# Patient Record
Sex: MALE | Race: WHITE | NOT HISPANIC OR LATINO | Employment: FULL TIME | URBAN - METROPOLITAN AREA
[De-identification: names, ages, dates, MRNs, and addresses within clinical notes are randomized per-mention and may not be internally consistent; named-entity substitution may affect disease eponyms.]

---

## 2023-10-23 ENCOUNTER — OFFICE VISIT (OUTPATIENT)
Dept: INTERNAL MEDICINE CLINIC | Facility: CLINIC | Age: 53
End: 2023-10-23
Payer: COMMERCIAL

## 2023-10-23 VITALS
DIASTOLIC BLOOD PRESSURE: 80 MMHG | HEIGHT: 71 IN | TEMPERATURE: 98 F | HEART RATE: 96 BPM | BODY MASS INDEX: 27.16 KG/M2 | WEIGHT: 194 LBS | SYSTOLIC BLOOD PRESSURE: 110 MMHG | OXYGEN SATURATION: 97 %

## 2023-10-23 DIAGNOSIS — G62.0 NEUROPATHY DUE TO CHEMOTHERAPEUTIC DRUG: ICD-10-CM

## 2023-10-23 DIAGNOSIS — F90.0 ATTENTION DEFICIT HYPERACTIVITY DISORDER (ADHD), PREDOMINANTLY INATTENTIVE TYPE: ICD-10-CM

## 2023-10-23 DIAGNOSIS — C77.2 COLON CANCER METASTASIZED TO INTRA-ABDOMINAL LYMPH NODE (HCC): Primary | ICD-10-CM

## 2023-10-23 DIAGNOSIS — Z13.0 SCREENING FOR DEFICIENCY ANEMIA: ICD-10-CM

## 2023-10-23 DIAGNOSIS — R73.9 HYPERGLYCEMIA: ICD-10-CM

## 2023-10-23 DIAGNOSIS — C18.9 COLON CANCER METASTASIZED TO INTRA-ABDOMINAL LYMPH NODE (HCC): Primary | ICD-10-CM

## 2023-10-23 DIAGNOSIS — Z13.6 SCREENING FOR CARDIOVASCULAR CONDITION: ICD-10-CM

## 2023-10-23 DIAGNOSIS — F32.0 CURRENT MILD EPISODE OF MAJOR DEPRESSIVE DISORDER WITHOUT PRIOR EPISODE (HCC): ICD-10-CM

## 2023-10-23 DIAGNOSIS — E55.9 VITAMIN D DEFICIENCY: ICD-10-CM

## 2023-10-23 DIAGNOSIS — E78.2 MIXED HYPERLIPIDEMIA: ICD-10-CM

## 2023-10-23 DIAGNOSIS — M54.16 LUMBAR RADICULOPATHY: ICD-10-CM

## 2023-10-23 DIAGNOSIS — T45.1X5A NEUROPATHY DUE TO CHEMOTHERAPEUTIC DRUG: ICD-10-CM

## 2023-10-23 PROCEDURE — 99203 OFFICE O/P NEW LOW 30 MIN: CPT | Performed by: INTERNAL MEDICINE

## 2023-10-23 NOTE — ASSESSMENT & PLAN NOTE
Plans he is on treatment for ADHD and current medication does not know the name and the dosage of the medication advised to call us with information

## 2023-10-23 NOTE — PROGRESS NOTES
Dr. Yovana Quintana Office Visit Note  10/23/23     iNsh Torrez 48 y.o. male MRN: [id number]  : 1970    Assessment:   Awaiting old medical records awaiting medical records from the previous physician  1. Colon cancer metastasized to intra-abdominal lymph node [facility])  Assessment & Plan:  Patient seen in the office first time [place] history of for colon cancer treated with surgery and chemo finished chemo about 4 months ago details of the surgery chemo and the staging information not available yet requested to get as soon as possible but as per patient stage III colon cancer abdominal lymph nodes involved to be evaluated by GI and oncology as patient requested    Orders:  -     Ambulatory Referral to Gastroenterology; Future  -     Ambulatory Referral to Hematology / Oncology; Future    2. Vitamin D deficiency  -     Vitamin D 25 hydroxy; Future; Expected date: 2023    3. Screening for cardiovascular condition  -     Comprehensive metabolic panel; Future    4. Hyperglycemia  -     Hemoglobin A1C; Future  -     Urinalysis with microscopic; Future  -     Albumin / creatinine urine ratio; Future    5. Screening for deficiency anemia  -     CBC and differential; Future    6. Mixed hyperlipidemia  -     Comprehensive metabolic panel; Future  -     Lipid Panel with Direct LDL reflex; Future    7. Attention deficit hyperactivity disorder (ADHD), predominantly inattentive type  Assessment & Plan:  Plans he is on treatment for ADHD and current medication does not know the name and the dosage of the medication advised to call us with information    Orders:  -     Comprehensive metabolic panel; Future    8. Lumbar radiculopathy  -     Comprehensive metabolic panel; Future    9.  Current mild episode of major depressive disorder without prior episode [facility])  Assessment & Plan:  Patient is claims he is on antidepressant does not know the name and dosage advised to call us with information      10. Neuropathy due to chemotherapeutic drug   Assessment & Plan:  Patient complains of tingling numbness both upper lower extremity and diagnosis of neuropathy due to the chemo on gabapentin the dose if not available at present time seems to be helping continue gabapentin the dose is advised to call us with information            Discussion Summary and Plan: Today's care plan and medications were reviewed with patient in detail and all their questions answered to their satisfaction. Chief Complaint   Patient presents with   • New Patient Visit     Referral for an oncologist to follow up for cancer stage 3 colon cancer. Went though chemo and surgery. Subjective:  Patient came in first time in the office like to be established with a primary care physician and patient has no medical record at present time and all the information obtained from the patient verbally diagnosis stage III colon cancer underwent surgery does not know the details underwent chemo does not have the detail developed neuropathy both upper lower extremity and gabapentin seems to be improving improved history of depression on antidepressant and history of ADHD treatment. Does not know the detail he was given complete blood work to be seen next week with all the medication information and will try to get the old medical record from a previous physician as soon as possible        The following portions of the patient's history were reviewed and updated as appropriate: allergies, current medications, past family history, past medical history, past social history, past surgical history and problem list.    Review of Systems   Constitutional:  Positive for activity change. Negative for appetite change, chills, diaphoresis, fatigue, fever and unexpected weight change.    HENT:  Negative for congestion, dental problem, drooling, ear discharge, ear pain, facial swelling, hearing loss, mouth sores, nosebleeds, postnasal drip, rhinorrhea, sinus pressure, sneezing, sore throat, tinnitus, trouble swallowing and voice change. Eyes:  Negative for photophobia, pain, discharge, redness, itching and visual disturbance. Respiratory:  Negative for apnea, cough, choking, chest tightness, shortness of breath, wheezing and stridor. Cardiovascular:  Negative for chest pain, palpitations and leg swelling. Gastrointestinal:  Negative for abdominal distention, abdominal pain, anal bleeding, blood in stool, constipation, diarrhea, nausea, rectal pain and vomiting. Endocrine: Negative for cold intolerance, heat intolerance, polydipsia, polyphagia and polyuria. Genitourinary:  Negative for decreased urine volume, difficulty urinating, dysuria, enuresis, flank pain, frequency, genital sores, hematuria and urgency. Musculoskeletal:  Positive for arthralgias. Negative for back pain, gait problem, joint swelling, myalgias, neck pain and neck stiffness. Skin:  Negative for color change, pallor, rash and wound. Allergic/Immunologic: Negative. Negative for environmental allergies, food allergies and immunocompromised state. Neurological:  Positive for numbness. Negative for dizziness, tremors, seizures, syncope, facial asymmetry, speech difficulty, weakness, light-headedness and headaches. Psychiatric/Behavioral:  Negative for agitation, behavioral problems, confusion, decreased concentration, dysphoric mood, hallucinations, self-injury, sleep disturbance and suicidal ideas. The patient is not nervous/anxious and is not hyperactive. Historical Information   Patient Active Problem List   Diagnosis   • Colon cancer metastasized to intra-abdominal lymph node ([address])   • Current mild episode of major depressive disorder without prior episode [facility])   • Lumbar radiculopathy   • Attention deficit hyperactivity disorder (ADHD), predominantly inattentive type   • Neuropathy due to chemotherapeutic drug      History reviewed.  No pertinent past medical history. History reviewed. No pertinent surgical history. Social History     Substance and Sexual Activity   Alcohol Use Never     Social History     Substance and Sexual Activity   Drug Use Never     Social History     Tobacco Use   Smoking Status Never   Smokeless Tobacco Never     History reviewed. No pertinent family history. Health Maintenance Due   Topic   • Hepatitis C Screening    • COVID-19 Vaccine (1)   • Pneumococcal Vaccine: Pediatrics (0 to 5 Years) and At-Risk Patients (6 to 59 Years) (1 - PCV)   • Depression Remission PHQ    • HIV Screening    • BMI: Followup Plan    • Annual Physical    • DTaP,Tdap,and Td Vaccines (1 - Tdap)   • Influenza Vaccine (1)      Meds/Allergies     No current outpatient medications on file. Objective:    Vitals:   /80   Pulse 96   Temp 98 °F (36.7 °C)   Ht 5' 11" (1.803 m)   Wt 88 kg (194 lb)   SpO2 97%   BMI 27.06 kg/m²   Body mass index is 27.06 kg/m². Vitals:    10/23/23 1517   Weight: 88 kg (194 lb)       Physical Exam  Constitutional:       General: He is not in acute distress. Appearance: He is well-developed. He is not ill-appearing, toxic-appearing or diaphoretic. HENT:      Head: Normocephalic and atraumatic. Right Ear: External ear normal.      Left Ear: External ear normal.      Nose: Nose normal.      Mouth/Throat:      Pharynx: No oropharyngeal exudate. Eyes:      General: Lids are normal. Lids are everted, no foreign bodies appreciated. No scleral icterus. Right eye: No discharge. Left eye: No discharge. Conjunctiva/sclera: Conjunctivae normal.      Pupils: Pupils are equal, round, and reactive to light. Neck:      Thyroid: No thyromegaly. Vascular: Normal carotid pulses. No carotid bruit, hepatojugular reflux or JVD. Trachea: No tracheal tenderness or tracheal deviation. Cardiovascular:      Rate and Rhythm: Normal rate and regular rhythm. Pulses: Normal pulses.       Heart sounds: Normal heart sounds. No murmur heard. No friction rub. No gallop. Pulmonary:      Effort: Pulmonary effort is normal. No respiratory distress. Breath sounds: Normal breath sounds. No stridor. No wheezing or rales. Chest:      Chest wall: No tenderness. Abdominal:      General: Bowel sounds are normal. There is no distension. Palpations: Abdomen is soft. There is no mass. Tenderness: There is no abdominal tenderness. There is no guarding or rebound. Musculoskeletal:         General: No tenderness or deformity. Normal range of motion. Cervical back: Normal range of motion and neck supple. No edema, erythema or rigidity. No spinous process tenderness or muscular tenderness. Normal range of motion. Lymphadenopathy:      Head:      Right side of head: No submental, submandibular, tonsillar, preauricular or posterior auricular adenopathy. Left side of head: No submental, submandibular, tonsillar, preauricular, posterior auricular or occipital adenopathy. Cervical: No cervical adenopathy. Right cervical: No superficial, deep or posterior cervical adenopathy. Left cervical: No superficial, deep or posterior cervical adenopathy. Upper Body:      Right upper body: No pectoral adenopathy. Left upper body: No pectoral adenopathy. Skin:     General: Skin is warm and dry. Coloration: Skin is not pale. Findings: No erythema or rash. Neurological:      General: No focal deficit present. Mental Status: He is alert and oriented to person, place, and time. Cranial Nerves: No cranial nerve deficit. Sensory: No sensory deficit. Motor: No tremor, abnormal muscle tone or seizure activity. Coordination: Coordination normal.      Gait: Gait normal.      Deep Tendon Reflexes: Reflexes are normal and symmetric. Reflexes normal.   Psychiatric:         Behavior: Behavior normal.         Thought Content:  Thought content normal. Judgment: Judgment normal.         Lab Review   No visits with results within 2 Month(s) from this visit. Latest known visit with results is:   No results found for any previous visit. Patient Instructions   Pt is symptom free for this problem. This diagnosis or problem is stable/well controlled  Patient is advised to continue same meds as outlined in medicine list  Pt is advised to continue to follow with specialist if they are following for this problme  Pt should get blood test or other testing as per specialist ( or myself) prior to your next visit. Alicia Hilario MD        "This note has been constructed using a voice recognition system. Therefore there may be syntax, spelling, and/or grammatical errors.  Please call if you have any questions. "

## 2023-10-23 NOTE — ASSESSMENT & PLAN NOTE
Patient seen in the office first time [place] history of for colon cancer treated with surgery and chemo finished chemo about 4 months ago details of the surgery chemo and the staging information not available yet requested to get as soon as possible but as per patient stage III colon cancer abdominal lymph nodes involved to be evaluated by GI and oncology as patient requested

## 2023-10-23 NOTE — ASSESSMENT & PLAN NOTE
Patient complains of tingling numbness both upper lower extremity and diagnosis of neuropathy due to the chemo on gabapentin the dose if not available at present time seems to be helping continue gabapentin the dose is advised to call us with information

## 2023-10-23 NOTE — PATIENT INSTRUCTIONS
Pt is symptom free for this problem. This diagnosis or problem is stable/well controlled  Patient is advised to continue same meds as outlined in medicine list  Pt is advised to continue to follow with specialist if they are following for this problme  Pt should get blood test or other testing as per specialist ( or myself) prior to your next visit.

## 2023-10-23 NOTE — ASSESSMENT & PLAN NOTE
Patient is claims he is on antidepressant does not know the name and dosage advised to call us with information

## 2023-10-24 ENCOUNTER — TELEPHONE (OUTPATIENT)
Dept: HEMATOLOGY ONCOLOGY | Facility: CLINIC | Age: 53
End: 2023-10-24

## 2023-10-24 NOTE — TELEPHONE ENCOUNTER
I called [facility] in response to a referral that was received for patient to establish care with Medical Oncology. Outreach was made to schedule a consultation. Patient does not have a voicemail set up. Another attempt will be made to contact patient.

## 2023-10-26 ENCOUNTER — TELEPHONE (OUTPATIENT)
Dept: HEMATOLOGY ONCOLOGY | Facility: CLINIC | Age: 53
End: 2023-10-26

## 2023-10-26 NOTE — TELEPHONE ENCOUNTER
I called Nina Escalante in response to a referral that was received for patient to establish care with Medical Oncology. Outreach was made to schedule a consultation. Patient does not have a voicemail set up. Another attempt will be made to contact patient.

## 2023-10-27 ENCOUNTER — TELEPHONE (OUTPATIENT)
Dept: HEMATOLOGY ONCOLOGY | Facility: CLINIC | Age: 53
End: 2023-10-27

## 2023-10-27 NOTE — TELEPHONE ENCOUNTER
I called [facility] in response to a referral that was received for patient to establish care with Medical Oncology. Outreach was made to schedule a consultation. Patient does not have a voicemail set up. The referral has been closed.

## 2023-11-07 DIAGNOSIS — G62.9 NEUROPATHY: ICD-10-CM

## 2023-11-07 DIAGNOSIS — N52.9 ERECTILE DYSFUNCTION, UNSPECIFIED ERECTILE DYSFUNCTION TYPE: Primary | ICD-10-CM

## 2023-11-07 DIAGNOSIS — F90.9 ATTENTION DEFICIT HYPERACTIVITY DISORDER (ADHD), UNSPECIFIED ADHD TYPE: ICD-10-CM

## 2023-11-07 DIAGNOSIS — F32.A DEPRESSION, UNSPECIFIED DEPRESSION TYPE: ICD-10-CM

## 2023-11-07 DIAGNOSIS — E11.65 TYPE 2 DIABETES MELLITUS WITH HYPERGLYCEMIA, WITHOUT LONG-TERM CURRENT USE OF INSULIN (HCC): ICD-10-CM

## 2023-11-07 RX ORDER — ESCITALOPRAM OXALATE 20 MG/1
20 TABLET ORAL DAILY
COMMUNITY
End: 2023-11-07 | Stop reason: SDUPTHER

## 2023-11-07 RX ORDER — PIOGLITAZONEHYDROCHLORIDE 15 MG/1
15 TABLET ORAL DAILY
COMMUNITY
End: 2023-11-07 | Stop reason: SDUPTHER

## 2023-11-07 RX ORDER — MULTIVIT WITH MINERALS/LUTEIN
1000 TABLET ORAL DAILY
COMMUNITY

## 2023-11-07 RX ORDER — LISDEXAMFETAMINE DIMESYLATE CAPSULES 70 MG/1
70 CAPSULE ORAL EVERY MORNING
COMMUNITY
End: 2023-11-07 | Stop reason: SDUPTHER

## 2023-11-07 RX ORDER — PIOGLITAZONEHYDROCHLORIDE 15 MG/1
15 TABLET ORAL DAILY
Qty: 90 TABLET | Refills: 0 | Status: SHIPPED | OUTPATIENT
Start: 2023-11-07

## 2023-11-07 RX ORDER — SILDENAFIL 100 MG/1
100 TABLET, FILM COATED ORAL DAILY PRN
Qty: 10 TABLET | Refills: 1 | Status: SHIPPED | OUTPATIENT
Start: 2023-11-07

## 2023-11-07 RX ORDER — SILDENAFIL 100 MG/1
100 TABLET, FILM COATED ORAL DAILY PRN
Qty: 10 TABLET | Refills: 1 | Status: SHIPPED | OUTPATIENT
Start: 2023-11-07 | End: 2023-11-07 | Stop reason: SDUPTHER

## 2023-11-07 RX ORDER — GABAPENTIN 300 MG/1
300 CAPSULE ORAL DAILY
COMMUNITY
End: 2023-11-07 | Stop reason: SDUPTHER

## 2023-11-07 RX ORDER — SILDENAFIL 100 MG/1
100 TABLET, FILM COATED ORAL DAILY PRN
COMMUNITY
End: 2023-11-07 | Stop reason: SDUPTHER

## 2023-11-07 RX ORDER — GABAPENTIN 300 MG/1
300 CAPSULE ORAL DAILY
Qty: 30 CAPSULE | Refills: 0 | Status: SHIPPED | OUTPATIENT
Start: 2023-11-07

## 2023-11-07 RX ORDER — AMOXICILLIN 500 MG
1200 CAPSULE ORAL DAILY
COMMUNITY

## 2023-11-07 RX ORDER — OMEPRAZOLE 20 MG/1
20 CAPSULE, DELAYED RELEASE ORAL DAILY
COMMUNITY

## 2023-11-07 RX ORDER — LISDEXAMFETAMINE DIMESYLATE CAPSULES 70 MG/1
70 CAPSULE ORAL EVERY MORNING
Qty: 30 CAPSULE | Refills: 0 | Status: SHIPPED | OUTPATIENT
Start: 2023-11-07

## 2023-11-07 RX ORDER — ZINC GLUCONATE 50 MG
50 TABLET ORAL DAILY
COMMUNITY

## 2023-11-07 RX ORDER — ESCITALOPRAM OXALATE 20 MG/1
20 TABLET ORAL DAILY
Qty: 30 TABLET | Refills: 0 | Status: SHIPPED | OUTPATIENT
Start: 2023-11-07

## 2023-11-07 NOTE — TELEPHONE ENCOUNTER
Patient called to report that the pharmacy this was sent to does not take his insurance.   He is requesting that it please be re-sent to the followin [facility]  [phone]

## 2023-11-08 DIAGNOSIS — F32.A DEPRESSION, UNSPECIFIED DEPRESSION TYPE: ICD-10-CM

## 2023-11-08 DIAGNOSIS — E11.65 TYPE 2 DIABETES MELLITUS WITH HYPERGLYCEMIA, WITHOUT LONG-TERM CURRENT USE OF INSULIN (HCC): ICD-10-CM

## 2023-11-08 DIAGNOSIS — G62.9 NEUROPATHY: ICD-10-CM

## 2023-11-08 DIAGNOSIS — F90.9 ATTENTION DEFICIT HYPERACTIVITY DISORDER (ADHD), UNSPECIFIED ADHD TYPE: ICD-10-CM

## 2023-11-08 RX ORDER — PIOGLITAZONEHYDROCHLORIDE 15 MG/1
15 TABLET ORAL DAILY
Qty: 90 TABLET | Refills: 0 | OUTPATIENT
Start: 2023-11-08

## 2023-11-08 RX ORDER — LISDEXAMFETAMINE DIMESYLATE CAPSULES 70 MG/1
70 CAPSULE ORAL EVERY MORNING
Qty: 30 CAPSULE | Refills: 0 | OUTPATIENT
Start: 2023-11-08

## 2023-11-08 RX ORDER — ESCITALOPRAM OXALATE 20 MG/1
20 TABLET ORAL DAILY
Qty: 30 TABLET | Refills: 0 | OUTPATIENT
Start: 2023-11-08

## 2023-11-08 RX ORDER — GABAPENTIN 300 MG/1
300 CAPSULE ORAL DAILY
Qty: 30 CAPSULE | Refills: 0 | OUTPATIENT
Start: 2023-11-08

## 2023-11-08 NOTE — TELEPHONE ENCOUNTER
Pt called stating his pharmacy did not receive 4 of his prescriptions.   He is asking if they can be sent to another [facility] closer to his job\

## 2023-11-17 ENCOUNTER — APPOINTMENT (OUTPATIENT)
Dept: LAB | Facility: CLINIC | Age: 53
End: 2023-11-17
Payer: COMMERCIAL

## 2023-11-17 DIAGNOSIS — R73.9 HYPERGLYCEMIA: ICD-10-CM

## 2023-11-17 DIAGNOSIS — Z13.0 SCREENING FOR DEFICIENCY ANEMIA: ICD-10-CM

## 2023-11-17 DIAGNOSIS — Z13.6 SCREENING FOR CARDIOVASCULAR CONDITION: ICD-10-CM

## 2023-11-17 DIAGNOSIS — F90.0 ATTENTION DEFICIT HYPERACTIVITY DISORDER (ADHD), PREDOMINANTLY INATTENTIVE TYPE: ICD-10-CM

## 2023-11-17 DIAGNOSIS — E78.2 MIXED HYPERLIPIDEMIA: ICD-10-CM

## 2023-11-17 DIAGNOSIS — M54.16 LUMBAR RADICULOPATHY: ICD-10-CM

## 2023-11-17 DIAGNOSIS — E55.9 VITAMIN D DEFICIENCY: ICD-10-CM

## 2023-11-17 LAB
25(OH)D3 SERPL-MCNC: 53.4 NG/ML (ref 30–100)
ALBUMIN SERPL BCP-MCNC: 4.2 G/DL (ref 3.5–5)
ALP SERPL-CCNC: 88 U/L (ref 34–104)
ALT SERPL W P-5'-P-CCNC: 21 U/L (ref 7–52)
ANION GAP SERPL CALCULATED.3IONS-SCNC: 5 MMOL/L
AST SERPL W P-5'-P-CCNC: 21 U/L (ref 13–39)
BACTERIA UR QL AUTO: ABNORMAL /HPF
BASOPHILS # BLD AUTO: 0.03 THOUSANDS/ÂΜL (ref 0–0.1)
BASOPHILS NFR BLD AUTO: 1 % (ref 0–1)
BILIRUB SERPL-MCNC: 0.74 MG/DL (ref 0.2–1)
BILIRUB UR QL STRIP: NEGATIVE
BUN SERPL-MCNC: 14 MG/DL (ref 5–25)
CALCIUM SERPL-MCNC: 9.4 MG/DL (ref 8.4–10.2)
CHLORIDE SERPL-SCNC: 104 MMOL/L (ref 96–108)
CHOLEST SERPL-MCNC: 243 MG/DL
CLARITY UR: ABNORMAL
CO2 SERPL-SCNC: 32 MMOL/L (ref 21–32)
COLOR UR: YELLOW
CREAT SERPL-MCNC: 0.82 MG/DL (ref 0.6–1.3)
CREAT UR-MCNC: 204.7 MG/DL
EOSINOPHIL # BLD AUTO: 0.09 THOUSAND/ÂΜL (ref 0–0.61)
EOSINOPHIL NFR BLD AUTO: 2 % (ref 0–6)
ERYTHROCYTE [DISTWIDTH] IN BLOOD BY AUTOMATED COUNT: 12 % (ref 11.6–15.1)
EST. AVERAGE GLUCOSE BLD GHB EST-MCNC: 143 MG/DL
GFR SERPL CREATININE-BSD FRML MDRD: 102 ML/MIN/1.73SQ M
GLUCOSE P FAST SERPL-MCNC: 92 MG/DL (ref 65–99)
GLUCOSE UR STRIP-MCNC: NEGATIVE MG/DL
HBA1C MFR BLD: 6.6 %
HCT VFR BLD AUTO: 45.9 % (ref 36.5–49.3)
HDLC SERPL-MCNC: 75 MG/DL
HGB BLD-MCNC: 15.4 G/DL (ref 12–17)
HGB UR QL STRIP.AUTO: NEGATIVE
IMM GRANULOCYTES # BLD AUTO: 0.01 THOUSAND/UL (ref 0–0.2)
IMM GRANULOCYTES NFR BLD AUTO: 0 % (ref 0–2)
KETONES UR STRIP-MCNC: NEGATIVE MG/DL
LDLC SERPL CALC-MCNC: 147 MG/DL (ref 0–100)
LEUKOCYTE ESTERASE UR QL STRIP: NEGATIVE
LYMPHOCYTES # BLD AUTO: 1.94 THOUSANDS/ÂΜL (ref 0.6–4.47)
LYMPHOCYTES NFR BLD AUTO: 36 % (ref 14–44)
MCH RBC QN AUTO: 32.4 PG (ref 26.8–34.3)
MCHC RBC AUTO-ENTMCNC: 33.6 G/DL (ref 31.4–37.4)
MCV RBC AUTO: 97 FL (ref 82–98)
MICROALBUMIN UR-MCNC: 9 MG/L
MICROALBUMIN/CREAT 24H UR: 4 MG/G CREATININE (ref 0–30)
MONOCYTES # BLD AUTO: 0.67 THOUSAND/ÂΜL (ref 0.17–1.22)
MONOCYTES NFR BLD AUTO: 12 % (ref 4–12)
MUCOUS THREADS UR QL AUTO: ABNORMAL
NEUTROPHILS # BLD AUTO: 2.68 THOUSANDS/ÂΜL (ref 1.85–7.62)
NEUTS SEG NFR BLD AUTO: 49 % (ref 43–75)
NITRITE UR QL STRIP: NEGATIVE
NON-SQ EPI CELLS URNS QL MICRO: ABNORMAL /HPF
NRBC BLD AUTO-RTO: 0 /100 WBCS
PH UR STRIP.AUTO: 5.5 [PH]
PLATELET # BLD AUTO: 214 THOUSANDS/UL (ref 149–390)
PMV BLD AUTO: 9.4 FL (ref 8.9–12.7)
POTASSIUM SERPL-SCNC: 4.1 MMOL/L (ref 3.5–5.3)
PROT SERPL-MCNC: 6.9 G/DL (ref 6.4–8.4)
PROT UR STRIP-MCNC: ABNORMAL MG/DL
RBC # BLD AUTO: 4.75 MILLION/UL (ref 3.88–5.62)
RBC #/AREA URNS AUTO: ABNORMAL /HPF
SODIUM SERPL-SCNC: 141 MMOL/L (ref 135–147)
SP GR UR STRIP.AUTO: 1.03 (ref 1–1.03)
TRIGL SERPL-MCNC: 105 MG/DL
UROBILINOGEN UR STRIP-ACNC: <2 MG/DL
WBC # BLD AUTO: 5.42 THOUSAND/UL (ref 4.31–10.16)
WBC #/AREA URNS AUTO: ABNORMAL /HPF

## 2023-11-17 PROCEDURE — 81001 URINALYSIS AUTO W/SCOPE: CPT

## 2023-11-17 PROCEDURE — 36415 COLL VENOUS BLD VENIPUNCTURE: CPT

## 2023-11-17 PROCEDURE — 80061 LIPID PANEL: CPT

## 2023-11-17 PROCEDURE — 82570 ASSAY OF URINE CREATININE: CPT

## 2023-11-17 PROCEDURE — 85025 COMPLETE CBC W/AUTO DIFF WBC: CPT

## 2023-11-17 PROCEDURE — 82043 UR ALBUMIN QUANTITATIVE: CPT

## 2023-11-17 PROCEDURE — 80053 COMPREHEN METABOLIC PANEL: CPT

## 2023-11-17 PROCEDURE — 82306 VITAMIN D 25 HYDROXY: CPT

## 2023-11-17 PROCEDURE — 83036 HEMOGLOBIN GLYCOSYLATED A1C: CPT

## 2023-11-20 ENCOUNTER — OFFICE VISIT (OUTPATIENT)
Dept: INTERNAL MEDICINE CLINIC | Facility: CLINIC | Age: 53
End: 2023-11-20
Payer: COMMERCIAL

## 2023-11-20 VITALS
DIASTOLIC BLOOD PRESSURE: 80 MMHG | TEMPERATURE: 98 F | HEART RATE: 78 BPM | OXYGEN SATURATION: 95 % | WEIGHT: 198 LBS | SYSTOLIC BLOOD PRESSURE: 138 MMHG | HEIGHT: 71 IN | BODY MASS INDEX: 27.72 KG/M2

## 2023-11-20 DIAGNOSIS — C18.9 COLON CANCER METASTASIZED TO INTRA-ABDOMINAL LYMPH NODE (HCC): ICD-10-CM

## 2023-11-20 DIAGNOSIS — G62.0 NEUROPATHY DUE TO CHEMOTHERAPEUTIC DRUG: ICD-10-CM

## 2023-11-20 DIAGNOSIS — T45.1X5A NEUROPATHY DUE TO CHEMOTHERAPEUTIC DRUG: ICD-10-CM

## 2023-11-20 DIAGNOSIS — C77.2 COLON CANCER METASTASIZED TO INTRA-ABDOMINAL LYMPH NODE (HCC): ICD-10-CM

## 2023-11-20 DIAGNOSIS — N52.9 ERECTILE DYSFUNCTION, UNSPECIFIED ERECTILE DYSFUNCTION TYPE: ICD-10-CM

## 2023-11-20 DIAGNOSIS — F90.0 ATTENTION DEFICIT HYPERACTIVITY DISORDER (ADHD), PREDOMINANTLY INATTENTIVE TYPE: Primary | ICD-10-CM

## 2023-11-20 DIAGNOSIS — E78.2 MIXED HYPERLIPIDEMIA: ICD-10-CM

## 2023-11-20 DIAGNOSIS — Z23 ENCOUNTER FOR IMMUNIZATION: ICD-10-CM

## 2023-11-20 DIAGNOSIS — M54.16 LUMBAR RADICULOPATHY: ICD-10-CM

## 2023-11-20 DIAGNOSIS — R73.03 PREDIABETES: ICD-10-CM

## 2023-11-20 PROBLEM — N52.8 OTHER MALE ERECTILE DYSFUNCTION: Status: ACTIVE | Noted: 2023-11-20

## 2023-11-20 PROCEDURE — 90471 IMMUNIZATION ADMIN: CPT | Performed by: INTERNAL MEDICINE

## 2023-11-20 PROCEDURE — 90686 IIV4 VACC NO PRSV 0.5 ML IM: CPT | Performed by: INTERNAL MEDICINE

## 2023-11-20 PROCEDURE — 99214 OFFICE O/P EST MOD 30 MIN: CPT | Performed by: INTERNAL MEDICINE

## 2023-11-20 RX ORDER — LISDEXAMFETAMINE DIMESYLATE CAPSULES 70 MG/1
70 CAPSULE ORAL EVERY MORNING
Qty: 30 CAPSULE | Refills: 0 | Status: SHIPPED | OUTPATIENT
Start: 2023-11-20

## 2023-11-20 RX ORDER — ATORVASTATIN CALCIUM 40 MG/1
40 TABLET, FILM COATED ORAL DAILY
Qty: 90 TABLET | Refills: 1 | Status: SHIPPED | OUTPATIENT
Start: 2023-11-20

## 2023-11-20 RX ORDER — VARDENAFIL HYDROCHLORIDE 20 MG/1
20 TABLET ORAL DAILY PRN
Qty: 10 TABLET | Refills: 5 | Status: SHIPPED | OUTPATIENT
Start: 2023-11-20

## 2023-11-20 RX ORDER — GABAPENTIN 300 MG/1
300 CAPSULE ORAL DAILY
Qty: 30 CAPSULE | Refills: 5 | Status: SHIPPED | OUTPATIENT
Start: 2023-11-20

## 2023-11-20 RX ORDER — METFORMIN HYDROCHLORIDE 750 MG/1
750 TABLET, EXTENDED RELEASE ORAL
Qty: 90 TABLET | Refills: 1 | Status: SHIPPED | OUTPATIENT
Start: 2023-11-20 | End: 2024-05-18

## 2023-11-20 NOTE — ASSESSMENT & PLAN NOTE
Intermittent lower back pain rating lower extremity for now advised to use ibuprofen 400 mg 3 times a day as needed this seems to be helping patient recommended physical therapy patient reluctant

## 2023-11-20 NOTE — ASSESSMENT & PLAN NOTE
Last A1c 6.6  Advised diabetic diet start metformin  mg daily patient was on Actos Actos discontinued due to the weight gain continue monitoring blood sugar at home exercise diet to lose weight

## 2023-11-20 NOTE — ASSESSMENT & PLAN NOTE
Lab Results   Component Value Date    CHOLESTEROL 243 (H) 11/17/2023     Lab Results   Component Value Date    HDL 75 11/17/2023     Lab Results   Component Value Date    TRIG 105 11/17/2023       Lab Results   Component Value Date    LDLCALC 147 (H) 11/17/2023   Last  triglyceride normal will start Lipitor 40 mg daily with low-fat low-cholesterol diet Labs reviewed as above  Lab Results   Component Value Date    ALT 21 11/17/2023   Last ALT reviewed normal  No results found for: "[facility]"

## 2023-11-20 NOTE — ASSESSMENT & PLAN NOTE
Plans he is on treatment for ADHD and current medication does not know the name and the dosage of the medication advised to call us with information patient was given Vyvanse 70 mg daily awaiting old records advised to be seen by psychiatrist

## 2023-11-20 NOTE — PROGRESS NOTES
Dr. David Perdue Office Visit Note  23     Jodie Vargas 48 y.o. male MRN: [id number]  : 1970    Assessment:     1. Encounter for immunization  -     influenza vaccine, quadrivalent, 0.5 mL, preservative-free, for adult and pediatric patients 6 mos+ (AFLURIA, FLUARIX, FLULAVAL, FLUZONE)    2. Neuropathy  -     gabapentin (NEURONTIN) 300 mg capsule; Take 1 capsule (300 mg total) by mouth daily    3. Prediabetes  Assessment & Plan:  Last A1c 6.6  Advised diabetic diet start metformin  mg daily patient was on Actos Actos discontinued due to the weight gain continue monitoring blood sugar at home exercise diet to lose weight    Orders:  -     metFORMIN (GLUCOPHAGE-XR) 750 mg 24 hr tablet; Take 1 tablet (750 mg total) by mouth daily with breakfast    4. Attention deficit hyperactivity disorder (ADHD), predominantly inattentive type  Assessment & Plan:  Plans he is on treatment for ADHD and current medication does not know the name and the dosage of the medication advised to call us with information patient was given Vyvanse 70 mg daily awaiting old records advised to be seen by psychiatrist      5. Colon cancer metastasized to intra-abdominal lymph node [facility])  Assessment & Plan:  Patient seen in the office first time has [place] history of for colon cancer treated with surgery and chemo finished chemo about 4 months ago details of the surgery chemo and the staging information not available yet requested to get as soon as possible but as per patient stage III colon cancer abdominal lymph nodes involved to be evaluated by GI and oncology as patient requested still awaiting old records      6. Other male erectile dysfunction    7. Attention deficit hyperactivity disorder (ADHD), unspecified ADHD type  -     lisdexamfetamine (VYVANSE) 70 MG capsule; Take 1 capsule (70 mg total) by mouth every morning Max Daily Amount: 70 mg    8.  Mixed hyperlipidemia  Assessment & Plan: Lab Results   Component Value Date    CHOLESTEROL 243 (H) 11/17/2023     Lab Results   Component Value Date    HDL 75 11/17/2023     Lab Results   Component Value Date    TRIG 105 11/17/2023       Lab Results   Component Value Date    LDLCALC 147 (H) 11/17/2023   Last  triglyceride normal will start Lipitor 40 mg daily with low-fat low-cholesterol diet Labs reviewed as above  Lab Results   Component Value Date    ALT 21 11/17/2023   Last ALT reviewed normal  No results found for: "[address]"     Orders:  -     atorvastatin (LIPITOR) 40 mg tablet; Take 1 tablet (40 mg total) by mouth daily    9. Neuropathy due to chemotherapeutic drug   Assessment & Plan:  Patient complains of tingling numbness both upper lower extremity and diagnosis of neuropathy due to the chemo on gabapentin the dose if not available at present time seems to be helping continue gabapentin 300 mg at bedtime this regimen helping patient to controlled her symptoms      10. Erectile dysfunction, unspecified erectile dysfunction type  -     vardenafil (LEVITRA) 20 MG tablet; Take 1 tablet (20 mg total) by mouth daily as needed for erectile dysfunction          Discussion Summary and Plan: Today's care plan and medications were reviewed with patient in detail and all their questions answered to their satisfaction. Chief Complaint   Patient presents with    Follow-up     Wants to talk about getting the covid booster. Had lab work done.       Subjective:  Patient came in follow-up chronic medical condition listed under visit diagnosis and review of the labs patient's A1c is 6.6 Actos been gaining weight discontinued due to side effects and also reviewed the lab started if metformin  daily LDL high started Lipitor explained the side effects at length still awaiting records from the oncology and primary care physician about colon cancer with intra-abdominal metastasis to the lymph node        The following portions of the patient's history were reviewed and updated as appropriate: allergies, current medications, past family history, past medical history, past social history, past surgical history and problem list.    Review of Systems   Constitutional:  Positive for activity change. Negative for appetite change, chills, diaphoresis, fatigue, fever and unexpected weight change. HENT:  Negative for congestion, dental problem, drooling, ear discharge, ear pain, facial swelling, hearing loss, mouth sores, nosebleeds, postnasal drip, rhinorrhea, sinus pressure, sneezing, sore throat, tinnitus, trouble swallowing and voice change. Eyes:  Negative for photophobia, pain, discharge, redness, itching and visual disturbance. Respiratory:  Negative for apnea, cough, choking, chest tightness, shortness of breath, wheezing and stridor. Cardiovascular:  Negative for chest pain, palpitations and leg swelling. Gastrointestinal:  Negative for abdominal distention, abdominal pain, anal bleeding, blood in stool, constipation, diarrhea, nausea, rectal pain and vomiting. Endocrine: Negative for cold intolerance, heat intolerance, polydipsia, polyphagia and polyuria. Genitourinary:  Negative for decreased urine volume, difficulty urinating, dysuria, enuresis, flank pain, frequency, genital sores, hematuria and urgency. Musculoskeletal:  Negative for back pain, gait problem, joint swelling, myalgias, neck pain and neck stiffness. Skin:  Negative for color change, pallor, rash and wound. Allergic/Immunologic: Negative. Negative for environmental allergies, food allergies and immunocompromised state. Neurological:  Positive for numbness. Negative for dizziness, tremors, seizures, syncope, facial asymmetry, speech difficulty, weakness, light-headedness and headaches.    Psychiatric/Behavioral:  Negative for agitation, behavioral problems, confusion, decreased concentration, dysphoric mood, hallucinations, self-injury, sleep disturbance and suicidal ideas. The patient is not nervous/anxious and is not hyperactive. Historical Information   Patient Active Problem List   Diagnosis    Colon cancer metastasized to intra-abdominal lymph node (HCC)    Current mild episode of major depressive disorder without prior episode (HCC)    Lumbar radiculopathy    Attention deficit hyperactivity disorder (ADHD), predominantly inattentive type    Neuropathy due to chemotherapeutic drug     Prediabetes    Other male erectile dysfunction    Mixed hyperlipidemia     History reviewed. No pertinent past medical history. History reviewed. No pertinent surgical history. Social History     Substance and Sexual Activity   Alcohol Use Never     Social History     Substance and Sexual Activity   Drug Use Never     Social History     Tobacco Use   Smoking Status Never   Smokeless Tobacco Never     History reviewed. No pertinent family history.   Health Maintenance Due   Topic    Hepatitis C Screening     COVID-19 Vaccine (1)    Pneumococcal Vaccine: Pediatrics (0 to 5 Years) and At-Risk Patients (6 to 59 Years) (1 - PCV)    HIV Screening     BMI: Followup Plan     Annual Physical     DTaP,Tdap,and Td Vaccines (1 - Tdap)      Meds/Allergies       Current Outpatient Medications:     Ascorbic Acid (vitamin C) 1000 MG tablet, Take 1,000 mg by mouth daily, Disp: , Rfl:     atorvastatin (LIPITOR) 40 mg tablet, Take 1 tablet (40 mg total) by mouth daily, Disp: 90 tablet, Rfl: 1    escitalopram (LEXAPRO) 20 mg tablet, Take 1 tablet (20 mg total) by mouth daily, Disp: 30 tablet, Rfl: 0    gabapentin (NEURONTIN) 300 mg capsule, Take 1 capsule (300 mg total) by mouth daily, Disp: 30 capsule, Rfl: 5    lisdexamfetamine (VYVANSE) 70 MG capsule, Take 1 capsule (70 mg total) by mouth every morning Max Daily Amount: 70 mg, Disp: 30 capsule, Rfl: 0    metFORMIN (GLUCOPHAGE-XR) 750 mg 24 hr tablet, Take 1 tablet (750 mg total) by mouth daily with breakfast, Disp: 90 tablet, Rfl: 1    Omega-3 Fatty Acids (Fish Oil) 1200 MG CAPS, Take 1,200 mg by mouth in the morning, Disp: , Rfl:     omeprazole (PriLOSEC) 20 mg delayed release capsule, Take 20 mg by mouth daily, Disp: , Rfl:     sildenafil (VIAGRA) 100 mg tablet, Take 1 tablet (100 mg total) by mouth daily as needed for erectile dysfunction, Disp: 10 tablet, Rfl: 1    vardenafil (LEVITRA) 20 MG tablet, Take 1 tablet (20 mg total) by mouth daily as needed for erectile dysfunction, Disp: 10 tablet, Rfl: 5    zinc gluconate 50 mg tablet, Take 50 mg by mouth daily, Disp: , Rfl:       Objective:    Vitals:   /80   Pulse 78   Temp 98 °F (36.7 °C)   Ht 5' 11" (1.803 m)   Wt 89.8 kg (198 lb)   SpO2 95%   BMI 27.62 kg/m²   Body mass index is 27.62 kg/m². Vitals:    11/20/23 1431   Weight: 89.8 kg (198 lb)       Physical Exam  Vitals and nursing note reviewed. Constitutional:       General: He is not in acute distress. Appearance: He is well-developed. He is not ill-appearing, toxic-appearing or diaphoretic. HENT:      Head: Normocephalic and atraumatic. Right Ear: External ear normal.      Left Ear: External ear normal.      Nose: Nose normal.      Mouth/Throat:      Pharynx: No oropharyngeal exudate. Eyes:      General: Lids are normal. Lids are everted, no foreign bodies appreciated. No scleral icterus. Right eye: No discharge. Left eye: No discharge. Conjunctiva/sclera: Conjunctivae normal.      Pupils: Pupils are equal, round, and reactive to light. Neck:      Thyroid: No thyromegaly. Vascular: Normal carotid pulses. No carotid bruit, hepatojugular reflux or JVD. Trachea: No tracheal tenderness or tracheal deviation. Cardiovascular:      Rate and Rhythm: Normal rate and regular rhythm. Pulses: Normal pulses. Heart sounds: Normal heart sounds. No murmur heard. No friction rub. No gallop. Pulmonary:      Effort: Pulmonary effort is normal. No respiratory distress.       Breath sounds: Normal breath sounds. No stridor. No wheezing or rales. Chest:      Chest wall: No tenderness. Abdominal:      General: Bowel sounds are normal. There is no distension. Palpations: Abdomen is soft. There is no mass. Tenderness: There is no abdominal tenderness. There is no guarding or rebound. Musculoskeletal:         General: No tenderness or deformity. Normal range of motion. Cervical back: Normal range of motion and neck supple. No edema, erythema or rigidity. No spinous process tenderness or muscular tenderness. Normal range of motion. Lymphadenopathy:      Head:      Right side of head: No submental, submandibular, tonsillar, preauricular or posterior auricular adenopathy. Left side of head: No submental, submandibular, tonsillar, preauricular, posterior auricular or occipital adenopathy. Cervical: No cervical adenopathy. Right cervical: No superficial, deep or posterior cervical adenopathy. Left cervical: No superficial, deep or posterior cervical adenopathy. Upper Body:      Right upper body: No pectoral adenopathy. Left upper body: No pectoral adenopathy. Skin:     General: Skin is warm and dry. Coloration: Skin is not pale. Findings: No erythema or rash. Neurological:      General: No focal deficit present. Mental Status: He is alert and oriented to person, place, and time. Cranial Nerves: No cranial nerve deficit. Sensory: No sensory deficit. Motor: No tremor, abnormal muscle tone or seizure activity. Coordination: Coordination normal.      Gait: Gait normal.      Deep Tendon Reflexes: Reflexes are normal and symmetric. Reflexes normal.   Psychiatric:         Behavior: Behavior normal.         Thought Content:  Thought content normal.         Judgment: Judgment normal.         Lab Review   Appointment on 11/17/2023   Component Date Value Ref Range Status    WBC 11/17/2023 5.42  4.31 - 10.16 Thousand/uL Final    RBC 11/17/2023 4.75  3.88 - 5.62 Million/uL Final    Hemoglobin 11/17/2023 15.4  12.0 - 17.0 g/dL Final    Hematocrit 11/17/2023 45.9  36.5 - 49.3 % Final    MCV 11/17/2023 97  82 - 98 fL Final    MCH 11/17/2023 32.4  26.8 - 34.3 pg Final    MCHC 11/17/2023 33.6  31.4 - 37.4 g/dL Final    RDW 11/17/2023 12.0  11.6 - 15.1 % Final    MPV 11/17/2023 9.4  8.9 - 12.7 fL Final    Platelets 80/89/9767 214  149 - 390 Thousands/uL Final    nRBC 11/17/2023 0  /100 WBCs Final    Neutrophils Relative 11/17/2023 49  43 - 75 % Final    Immat GRANS % 11/17/2023 0  0 - 2 % Final    Lymphocytes Relative 11/17/2023 36  14 - 44 % Final    Monocytes Relative 11/17/2023 12  4 - 12 % Final    Eosinophils Relative 11/17/2023 2  0 - 6 % Final    Basophils Relative 11/17/2023 1  0 - 1 % Final    Neutrophils Absolute 11/17/2023 2.68  1.85 - 7.62 Thousands/µL Final    Immature Grans Absolute 11/17/2023 0.01  0.00 - 0.20 Thousand/uL Final    Lymphocytes Absolute 11/17/2023 1.94  0.60 - 4.47 Thousands/µL Final    Monocytes Absolute 11/17/2023 0.67  0.17 - 1.22 Thousand/µL Final    Eosinophils Absolute 11/17/2023 0.09  0.00 - 0.61 Thousand/µL Final    Basophils Absolute 11/17/2023 0.03  0.00 - 0.10 Thousands/µL Final    Sodium 11/17/2023 141  135 - 147 mmol/L Final    Potassium 11/17/2023 4.1  3.5 - 5.3 mmol/L Final    Chloride 11/17/2023 104  96 - 108 mmol/L Final    CO2 11/17/2023 32  21 - 32 mmol/L Final    ANION GAP 11/17/2023 5  mmol/L Final    BUN 11/17/2023 14  5 - 25 mg/dL Final    Creatinine 11/17/2023 0.82  0.60 - 1.30 mg/dL Final    Standardized to IDMS reference method    Glucose, Fasting 11/17/2023 92  65 - 99 mg/dL Final    Calcium 11/17/2023 9.4  8.4 - 10.2 mg/dL Final    AST 11/17/2023 21  13 - 39 U/L Final    ALT 11/17/2023 21  7 - 52 U/L Final    Specimen collection should occur prior to Sulfasalazine administration due to the potential for falsely depressed results.      Alkaline Phosphatase 11/17/2023 88  34 - 104 U/L Final Total Protein 11/17/2023 6.9  6.4 - 8.4 g/dL Final    Albumin 11/17/2023 4.2  3.5 - 5.0 g/dL Final    Total Bilirubin 11/17/2023 0.74  0.20 - 1.00 mg/dL Final    Use of this assay is not recommended for patients undergoing treatment with eltrombopag due to the potential for falsely elevated results. N-acetyl-p-benzoquinone imine (metabolite of Acetaminophen) will generate erroneously low results in samples for patients that have taken an overdose of Acetaminophen. eGFR 11/17/2023 102  ml/min/1.73sq m Final    Hemoglobin A1C 11/17/2023 6.6 (H)  Normal 4.0-5.6%; PreDiabetic 5.7-6.4%; Diabetic >=6.5%; Glycemic control for adults with diabetes <7.0% % Final    EAG 11/17/2023 143  mg/dl Final    Cholesterol 11/17/2023 243 (H)  See Comment mg/dL Final    Cholesterol:         Pediatric <18 Years        Desirable          <170 mg/dL      Borderline High    170-199 mg/dL      High               >=200 mg/dL        Adult >=18 Years            Desirable         <200 mg/dL      Borderline High   200-239 mg/dL      High              >239 mg/dL      Triglycerides 11/17/2023 105  See Comment mg/dL Final    Triglyceride:     0-9Y            <75mg/dL     10Y-17Y         <90 mg/dL       >=18Y     Normal          <150 mg/dL     Borderline High 150-199 mg/dL     High            200-499 mg/dL        Very High       >499 mg/dL    Specimen collection should occur prior to Metamizole administration due to the potential for falsely depressed results. HDL, Direct 11/17/2023 75  >=40 mg/dL Final    LDL Calculated 11/17/2023 147 (H)  0 - 100 mg/dL Final    LDL Cholesterol:     Optimal           <100 mg/dl     Near Optimal      100-129 mg/dl     Above Optimal       Borderline High 130-159 mg/dl       High            160-189 mg/dl       Very High       >189 mg/dl         This screening LDL is a calculated result.    It does not have the accuracy of the Direct Measured LDL in the monitoring of patients with hyperlipidemia and/or statin therapy. Direct Measure LDL (SCN296) must be ordered separately in these patients. Color, UA 11/17/2023 Yellow   Final    Clarity, UA 11/17/2023 Extra Turbid   Final    Specific Gravity, UA 11/17/2023 1.033 (H)  1.003 - 1.030 Final    pH, UA 11/17/2023 5.5  4.5, 5.0, 5.5, 6.0, 6.5, 7.0, 7.5, 8.0 Final    Leukocytes, UA 11/17/2023 Negative  Negative Final    Nitrite, UA 11/17/2023 Negative  Negative Final    Protein, UA 11/17/2023 30 (1+) (A)  Negative mg/dl Final    Glucose, UA 11/17/2023 Negative  Negative mg/dl Final    Ketones, UA 11/17/2023 Negative  Negative mg/dl Final    Urobilinogen, UA 11/17/2023 <2.0  <2.0 mg/dl mg/dl Final    Bilirubin, UA 11/17/2023 Negative  Negative Final    Occult Blood, UA 11/17/2023 Negative  Negative Final    RBC, UA 11/17/2023 2-4 (A)  None Seen, 1-2 /hpf Final    WBC, UA 11/17/2023 2-4 (A)  None Seen, 1-2 /hpf Final    Epithelial Cells 11/17/2023 Occasional  None Seen, Occasional /hpf Final    Bacteria, UA 11/17/2023 None Seen  None Seen, Occasional /hpf Final    MUCUS THREADS 11/17/2023 Occasional (A)  None Seen Final    Vit D, 25-Hydroxy 11/17/2023 53.4  30.0 - 100.0 ng/mL Final    Vitamin D guidelines established by Clinical Guidelines Subcommittee  of the Endocrine Society Task Force, 2011    Deficiency <20ng/ml   Insufficiency 20-30ng/ml   Sufficient  ng/ml     Creatinine, Ur 11/17/2023 204.7  Reference range not established. mg/dL Final    Albumin,U,Random 11/17/2023 9.0  <20.0 mg/L Final    Albumin Creat Ratio 11/17/2023 4  0 - 30 mg/g creatinine Final         Patient Instructions   Hyperlipidemia   AMBULATORY CARE:   Hyperlipidemia  is a high level of lipids (fats) in your blood. These lipids include cholesterol or triglycerides. Lipids are made by your body. They also come from the foods you eat. Your body needs lipids to work properly, but high levels increase your risk for heart disease, heart attack, and stroke.   Call your local emergency number (869 in the [place]) or have someone call if:   You have any of the following signs of a heart attack:      Squeezing, pressure, or pain in your chest    You may  also have any of the following:     Discomfort or pain in your back, neck, jaw, stomach, or arm    Shortness of breath    Nausea or vomiting    Lightheadedness or a sudden cold sweat    You have any of the following signs of a stroke:      Numbness or drooping on one side of your face     Weakness in an arm or leg    Confusion or difficulty speaking    Dizziness, a severe headache, or vision loss    Call your doctor if:   You have questions or concerns about your condition or care. Treatment  may first include lifestyle changes to help decrease your lipid levels. Your provider may recommend you work with a team to manage hyperlipidemia. The team may include medical experts such as a dietitian, an exercise or physical therapist, and a behavior therapist. Your family members may be included in helping you create lifestyle changes. You may also need to take medicine to lower your lipid levels. Some of the lifestyle changes you may need to make include the following:  Maintain a healthy weight. Ask your healthcare provider what a healthy weight is for you. Ask your provider to help you create a weight loss plan, if needed. Weight loss can decrease your cholesterol and triglyceride levels. Be physically active throughout the day. Physical activity, such as exercise, lowers your cholesterol levels and helps you maintain a healthy weight. Get 30 minutes or more of aerobic exercise 4 to 6 days each week. You can split your exercise into four 10-minute workouts instead of 30 minutes at one time. Examples of aerobic exercises include walking briskly, swimming, or riding a bike. Work with your healthcare provider to plan the best exercise program for you. Also include strength training at least 2 times each week.  Your healthcare providers can help you create a physical activity plan. Do not smoke. Nicotine and other chemicals in cigarettes and cigars can increase your risk for a heart attack and stroke. Ask your healthcare provider for information if you currently smoke and need help to quit. E-cigarettes or smokeless tobacco still contain nicotine. Talk to your healthcare provider before you use these products. Eat heart-healthy foods. A dietitian or your provider can give you more information on low-sodium plans or the DASH (Dietary Approaches to Stop Hypertension) eating plan. The DASH plan is low in sodium, processed sugar, unhealthy fats, and total fat. It is high in potassium, calcium, and fiber. It is high in potassium, calcium, and fiber. These can be found in vegetables, fruit, and whole-grain foods. The following are ways to get more heart-healthy foods:         Decrease the total amount of fat you eat. Choose lean meats, fat-free or 1% fat milk, and low-fat dairy products, such as yogurt and cheese. Limit or do not eat red meat. Red meats are high in fat and cholesterol. Replace unhealthy fats with healthy fats. Unhealthy fats include saturated fat, trans fat, and cholesterol. Choose soft margarines that are low in saturated fat and have little or no trans fat. Monounsaturated fats are healthy fats. These are found in olive oil, canola oil, avocado, and nuts. Polyunsaturated fats are also healthy. These are found in fish, flaxseed, walnuts, and soybeans. Eat 5 or more servings of fruits and vegetables every day. They are low in calories and fat and a good source of essential vitamins. Include dark green, red, and orange vegetables. Examples include spinach, kale, broccoli, and carrots. Eat foods high in fiber. Fiber can help lower your cholesterol levels. Choose whole grain, high-fiber foods. Good choices include whole-wheat breads or cereals, beans, peas, fruits, and vegetables. Limit sodium (salt) as directed.   Too much sodium can affect your fluid balance and blood pressure. Your healthcare provider will tell you how much sodium and potassium are safe for you to have in a day. Your provider may recommend that you limit sodium to 2,300 mg a day. Your provider or a dietitian can help you find ways to limit sodium. For example, if you add salt while you cook, do not add more salt at the table. Check labels to find low-sodium or no-salt-added foods. Some low-sodium foods use potassium salts for flavor. Too much potassium can also cause health problems. Ask your healthcare provider if it is okay for you to drink alcohol. Alcohol can increase your cholesterol and triglyceride levels. Your provider can tell you how many drinks are okay to have within 24 hours and within 1 week. A drink of alcohol is 12 ounces of beer, 5 ounces of wine, or 1½ ounces of liquor. Follow up with your doctor as directed: You may need to return for more tests. Your healthcare provider may refer you to a dietitian. Write down your questions so you remember to ask them during your visits. © Copyright [facility] [employer] 2023 Information is for End User's use only and may not be sold, redistributed or otherwise used for commercial purposes. The above information is an  only. It is not intended as medical advice for individual conditions or treatments. Talk to your doctor, nurse or pharmacist before following any medical regimen to see if it is safe and effective for you. Poncho Perez MD        "This note has been constructed using a voice recognition system. Therefore there may be syntax, spelling, and/or grammatical errors.  Please call if you have any questions. "

## 2023-11-20 NOTE — ASSESSMENT & PLAN NOTE
Patient complains of tingling numbness both upper lower extremity and diagnosis of neuropathy due to the chemo on gabapentin the dose if not available at present time seems to be helping continue gabapentin 300 mg at bedtime this regimen helping patient to controlled her symptoms

## 2023-11-20 NOTE — PATIENT INSTRUCTIONS
Hyperlipidemia   AMBULATORY CARE:   Hyperlipidemia  is a high level of lipids (fats) in your blood. These lipids include cholesterol or triglycerides. Lipids are made by your body. They also come from the foods you eat. Your body needs lipids to work properly, but high levels increase your risk for heart disease, heart attack, and stroke. Call your local emergency number (911 in the [address]) or have someone call if:   You have any of the following signs of a heart attack:      Squeezing, pressure, or pain in your chest    You may  also have any of the following:     Discomfort or pain in your back, neck, jaw, stomach, or arm    Shortness of breath    Nausea or vomiting    Lightheadedness or a sudden cold sweat    You have any of the following signs of a stroke:      Numbness or drooping on one side of your face     Weakness in an arm or leg    Confusion or difficulty speaking    Dizziness, a severe headache, or vision loss    Call your doctor if:   You have questions or concerns about your condition or care. Treatment  may first include lifestyle changes to help decrease your lipid levels. Your provider may recommend you work with a team to manage hyperlipidemia. The team may include medical experts such as a dietitian, an exercise or physical therapist, and a behavior therapist. Your family members may be included in helping you create lifestyle changes. You may also need to take medicine to lower your lipid levels. Some of the lifestyle changes you may need to make include the following:  Maintain a healthy weight. Ask your healthcare provider what a healthy weight is for you. Ask your provider to help you create a weight loss plan, if needed. Weight loss can decrease your cholesterol and triglyceride levels. Be physically active throughout the day. Physical activity, such as exercise, lowers your cholesterol levels and helps you maintain a healthy weight.  Get 30 minutes or more of aerobic exercise 4 to 6 days each week. You can split your exercise into four 10-minute workouts instead of 30 minutes at one time. Examples of aerobic exercises include walking briskly, swimming, or riding a bike. Work with your healthcare provider to plan the best exercise program for you. Also include strength training at least 2 times each week. Your healthcare providers can help you create a physical activity plan. Do not smoke. Nicotine and other chemicals in cigarettes and cigars can increase your risk for a heart attack and stroke. Ask your healthcare provider for information if you currently smoke and need help to quit. E-cigarettes or smokeless tobacco still contain nicotine. Talk to your healthcare provider before you use these products. Eat heart-healthy foods. A dietitian or your provider can give you more information on low-sodium plans or the DASH (Dietary Approaches to Stop Hypertension) eating plan. The DASH plan is low in sodium, processed sugar, unhealthy fats, and total fat. It is high in potassium, calcium, and fiber. It is high in potassium, calcium, and fiber. These can be found in vegetables, fruit, and whole-grain foods. The following are ways to get more heart-healthy foods:         Decrease the total amount of fat you eat. Choose lean meats, fat-free or 1% fat milk, and low-fat dairy products, such as yogurt and cheese. Limit or do not eat red meat. Red meats are high in fat and cholesterol. Replace unhealthy fats with healthy fats. Unhealthy fats include saturated fat, trans fat, and cholesterol. Choose soft margarines that are low in saturated fat and have little or no trans fat. Monounsaturated fats are healthy fats. These are found in olive oil, canola oil, avocado, and nuts. Polyunsaturated fats are also healthy. These are found in fish, flaxseed, walnuts, and soybeans. Eat 5 or more servings of fruits and vegetables every day.   They are low in calories and fat and a good source of essential vitamins. Include dark green, red, and orange vegetables. Examples include spinach, kale, broccoli, and carrots. Eat foods high in fiber. Fiber can help lower your cholesterol levels. Choose whole grain, high-fiber foods. Good choices include whole-wheat breads or cereals, beans, peas, fruits, and vegetables. Limit sodium (salt) as directed. Too much sodium can affect your fluid balance and blood pressure. Your healthcare provider will tell you how much sodium and potassium are safe for you to have in a day. Your provider may recommend that you limit sodium to 2,300 mg a day. Your provider or a dietitian can help you find ways to limit sodium. For example, if you add salt while you cook, do not add more salt at the table. Check labels to find low-sodium or no-salt-added foods. Some low-sodium foods use potassium salts for flavor. Too much potassium can also cause health problems. Ask your healthcare provider if it is okay for you to drink alcohol. Alcohol can increase your cholesterol and triglyceride levels. Your provider can tell you how many drinks are okay to have within 24 hours and within 1 week. A drink of alcohol is 12 ounces of beer, 5 ounces of wine, or 1½ ounces of liquor. Follow up with your doctor as directed: You may need to return for more tests. Your healthcare provider may refer you to a dietitian. Write down your questions so you remember to ask them during your visits. © Copyright [employer] Mar 2023 Information is for End User's use only and may not be sold, redistributed or otherwise used for commercial purposes. The above information is an  only. It is not intended as medical advice for individual conditions or treatments. Talk to your doctor, nurse or pharmacist before following any medical regimen to see if it is safe and effective for you.

## 2023-11-20 NOTE — ASSESSMENT & PLAN NOTE
Patient seen in the office first time [place] history of for colon cancer treated with surgery and chemo finished chemo about 4 months ago details of the surgery chemo and the staging information not available yet requested to get as soon as possible but as per patient stage III colon cancer abdominal lymph nodes involved to be evaluated by GI and oncology as patient requested still awaiting old records

## 2023-11-29 ENCOUNTER — PATIENT OUTREACH (OUTPATIENT)
Dept: HEMATOLOGY ONCOLOGY | Facility: CLINIC | Age: 53
End: 2023-11-29

## 2023-11-29 NOTE — PROGRESS NOTES
[facility] to request pts pathology and imaging records. Was told the pt has to call and give permission for the records to be released. Called Early  and instructed him to call, so the records can be faxed to us. He understood, and was thankful for the call.  I will follow up on the records being sent to us

## 2023-11-30 ENCOUNTER — DOCUMENTATION (OUTPATIENT)
Dept: HEMATOLOGY ONCOLOGY | Facility: CLINIC | Age: 53
End: 2023-11-30

## 2023-11-30 NOTE — PROGRESS NOTES
Intake received, chart reviewed for need of external records. Consulting: Dr. Rasheeda Parrish   Scheduled on 12/19/2023  Dx: Colon CA    ICD: C18.9        Images requested:  From [facility] via fax [phone], phone [phone]  If not uploaded electronically via [facility], disks will be sent directly to the Radiology Reading Room.      Routed to consulting providers team.     Message left with [facility] to verify fax number to send pathology slide request form

## 2023-12-01 ENCOUNTER — DOCUMENTATION (OUTPATIENT)
Dept: HEMATOLOGY ONCOLOGY | Facility: CLINIC | Age: 53
End: 2023-12-01

## 2023-12-01 NOTE — PROGRESS NOTES
Left a message with [facility] department to confirm available pathology and confirm fax number to send a pathology slide request form

## 2023-12-04 ENCOUNTER — DOCUMENTATION (OUTPATIENT)
Dept: HEMATOLOGY ONCOLOGY | Facility: CLINIC | Age: 53
End: 2023-12-04

## 2023-12-04 NOTE — PROGRESS NOTES
Pt is establishing care with medical oncology here at [address], records from [address] [place] [address]. Pt has a hx of colon CA that has been resected and treated w adjuvant FOLFOX X12 cycles, completed in 7/2023. He has been referred to medical oncology and sched w Dr. Jovon Boucher on 12/19/2023. All records are under MEDIA tab including imaging.

## 2023-12-05 ENCOUNTER — HOSPITAL ENCOUNTER (EMERGENCY)
Facility: HOSPITAL | Age: 53
End: 2023-12-06
Attending: EMERGENCY MEDICINE
Payer: COMMERCIAL

## 2023-12-05 ENCOUNTER — TELEPHONE (OUTPATIENT)
Age: 53
End: 2023-12-05

## 2023-12-05 DIAGNOSIS — R45.851 SUICIDAL IDEATIONS: Primary | ICD-10-CM

## 2023-12-05 DIAGNOSIS — G25.81 RESTLESS LEG: Primary | ICD-10-CM

## 2023-12-05 LAB
ALBUMIN SERPL BCP-MCNC: 3.9 G/DL (ref 3.5–5)
ALP SERPL-CCNC: 89 U/L (ref 34–104)
ALT SERPL W P-5'-P-CCNC: 18 U/L (ref 7–52)
AMPHETAMINES SERPL QL SCN: NEGATIVE
ANION GAP SERPL CALCULATED.3IONS-SCNC: 9 MMOL/L
AST SERPL W P-5'-P-CCNC: 21 U/L (ref 13–39)
BARBITURATES UR QL: NEGATIVE
BASOPHILS # BLD AUTO: 0.03 THOUSANDS/ÂΜL (ref 0–0.1)
BASOPHILS NFR BLD AUTO: 1 % (ref 0–1)
BENZODIAZ UR QL: NEGATIVE
BILIRUB SERPL-MCNC: 0.65 MG/DL (ref 0.2–1)
BILIRUB UR QL STRIP: NEGATIVE
BUN SERPL-MCNC: 12 MG/DL (ref 5–25)
CALCIUM SERPL-MCNC: 8.9 MG/DL (ref 8.4–10.2)
CHLORIDE SERPL-SCNC: 104 MMOL/L (ref 96–108)
CLARITY UR: CLEAR
CO2 SERPL-SCNC: 26 MMOL/L (ref 21–32)
COCAINE UR QL: NEGATIVE
COLOR UR: YELLOW
CREAT SERPL-MCNC: 0.9 MG/DL (ref 0.6–1.3)
EOSINOPHIL # BLD AUTO: 0.12 THOUSAND/ÂΜL (ref 0–0.61)
EOSINOPHIL NFR BLD AUTO: 2 % (ref 0–6)
ERYTHROCYTE [DISTWIDTH] IN BLOOD BY AUTOMATED COUNT: 11.9 % (ref 11.6–15.1)
ETHANOL SERPL-MCNC: <10 MG/DL
GFR SERPL CREATININE-BSD FRML MDRD: 98 ML/MIN/1.73SQ M
GLUCOSE SERPL-MCNC: 90 MG/DL (ref 65–140)
GLUCOSE UR STRIP-MCNC: NEGATIVE MG/DL
HCT VFR BLD AUTO: 42.4 % (ref 36.5–49.3)
HGB BLD-MCNC: 14.7 G/DL (ref 12–17)
HGB UR QL STRIP.AUTO: NEGATIVE
IMM GRANULOCYTES # BLD AUTO: 0.01 THOUSAND/UL (ref 0–0.2)
IMM GRANULOCYTES NFR BLD AUTO: 0 % (ref 0–2)
KETONES UR STRIP-MCNC: NEGATIVE MG/DL
LEUKOCYTE ESTERASE UR QL STRIP: NEGATIVE
LYMPHOCYTES # BLD AUTO: 2.16 THOUSANDS/ÂΜL (ref 0.6–4.47)
LYMPHOCYTES NFR BLD AUTO: 40 % (ref 14–44)
MCH RBC QN AUTO: 31.3 PG (ref 26.8–34.3)
MCHC RBC AUTO-ENTMCNC: 34.7 G/DL (ref 31.4–37.4)
MCV RBC AUTO: 90 FL (ref 82–98)
METHADONE UR QL: NEGATIVE
MONOCYTES # BLD AUTO: 0.71 THOUSAND/ÂΜL (ref 0.17–1.22)
MONOCYTES NFR BLD AUTO: 13 % (ref 4–12)
NEUTROPHILS # BLD AUTO: 2.38 THOUSANDS/ÂΜL (ref 1.85–7.62)
NEUTS SEG NFR BLD AUTO: 44 % (ref 43–75)
NITRITE UR QL STRIP: NEGATIVE
NRBC BLD AUTO-RTO: 0 /100 WBCS
OPIATES UR QL SCN: NEGATIVE
OXYCODONE+OXYMORPHONE UR QL SCN: NEGATIVE
PCP UR QL: NEGATIVE
PH UR STRIP.AUTO: 7 [PH]
PLATELET # BLD AUTO: 192 THOUSANDS/UL (ref 149–390)
PMV BLD AUTO: 8.7 FL (ref 8.9–12.7)
POTASSIUM SERPL-SCNC: 3.5 MMOL/L (ref 3.5–5.3)
PROT SERPL-MCNC: 6.5 G/DL (ref 6.4–8.4)
PROT UR STRIP-MCNC: NEGATIVE MG/DL
RBC # BLD AUTO: 4.7 MILLION/UL (ref 3.88–5.62)
SODIUM SERPL-SCNC: 139 MMOL/L (ref 135–147)
SP GR UR STRIP.AUTO: 1.02 (ref 1–1.03)
THC UR QL: NEGATIVE
TSH SERPL DL<=0.05 MIU/L-ACNC: 2.52 UIU/ML (ref 0.45–4.5)
UROBILINOGEN UR QL STRIP.AUTO: 1 E.U./DL
WBC # BLD AUTO: 5.41 THOUSAND/UL (ref 4.31–10.16)

## 2023-12-05 PROCEDURE — 80307 DRUG TEST PRSMV CHEM ANLYZR: CPT | Performed by: PHYSICIAN ASSISTANT

## 2023-12-05 PROCEDURE — 93005 ELECTROCARDIOGRAM TRACING: CPT

## 2023-12-05 PROCEDURE — 99285 EMERGENCY DEPT VISIT HI MDM: CPT | Performed by: PHYSICIAN ASSISTANT

## 2023-12-05 PROCEDURE — 99285 EMERGENCY DEPT VISIT HI MDM: CPT

## 2023-12-05 PROCEDURE — 85025 COMPLETE CBC W/AUTO DIFF WBC: CPT | Performed by: PHYSICIAN ASSISTANT

## 2023-12-05 PROCEDURE — 81003 URINALYSIS AUTO W/O SCOPE: CPT | Performed by: PHYSICIAN ASSISTANT

## 2023-12-05 PROCEDURE — 80053 COMPREHEN METABOLIC PANEL: CPT | Performed by: PHYSICIAN ASSISTANT

## 2023-12-05 PROCEDURE — 36415 COLL VENOUS BLD VENIPUNCTURE: CPT | Performed by: PHYSICIAN ASSISTANT

## 2023-12-05 PROCEDURE — 84443 ASSAY THYROID STIM HORMONE: CPT | Performed by: PHYSICIAN ASSISTANT

## 2023-12-05 PROCEDURE — 82077 ASSAY SPEC XCP UR&BREATH IA: CPT | Performed by: PHYSICIAN ASSISTANT

## 2023-12-05 RX ORDER — PRAMIPEXOLE DIHYDROCHLORIDE 0.5 MG/1
0.5 TABLET ORAL 3 TIMES DAILY
Status: DISCONTINUED | OUTPATIENT
Start: 2023-12-06 | End: 2023-12-06 | Stop reason: HOSPADM

## 2023-12-05 RX ORDER — PRAMIPEXOLE DIHYDROCHLORIDE 0.5 MG/1
0.5 TABLET ORAL 3 TIMES DAILY
Status: DISCONTINUED | OUTPATIENT
Start: 2023-12-05 | End: 2023-12-05

## 2023-12-05 RX ORDER — METFORMIN HYDROCHLORIDE 750 MG/1
750 TABLET, EXTENDED RELEASE ORAL
Status: DISCONTINUED | OUTPATIENT
Start: 2023-12-06 | End: 2023-12-06 | Stop reason: HOSPADM

## 2023-12-05 RX ORDER — GABAPENTIN 300 MG/1
300 CAPSULE ORAL DAILY
Status: DISCONTINUED | OUTPATIENT
Start: 2023-12-06 | End: 2023-12-06 | Stop reason: HOSPADM

## 2023-12-05 RX ORDER — ZINC GLUCONATE 50 MG
50 TABLET ORAL DAILY
Status: DISCONTINUED | OUTPATIENT
Start: 2023-12-06 | End: 2023-12-05

## 2023-12-05 RX ORDER — ASCORBIC ACID 500 MG
1000 TABLET ORAL DAILY
Status: DISCONTINUED | OUTPATIENT
Start: 2023-12-06 | End: 2023-12-06 | Stop reason: HOSPADM

## 2023-12-05 RX ORDER — PRAMIPEXOLE DIHYDROCHLORIDE 0.5 MG/1
0.5 TABLET ORAL 3 TIMES DAILY
COMMUNITY
End: 2023-12-05 | Stop reason: SDUPTHER

## 2023-12-05 RX ORDER — ESCITALOPRAM OXALATE 20 MG/1
20 TABLET ORAL DAILY
Status: DISCONTINUED | OUTPATIENT
Start: 2023-12-06 | End: 2023-12-06 | Stop reason: HOSPADM

## 2023-12-05 RX ORDER — ATORVASTATIN CALCIUM 40 MG/1
40 TABLET, FILM COATED ORAL DAILY
Status: DISCONTINUED | OUTPATIENT
Start: 2023-12-06 | End: 2023-12-06 | Stop reason: HOSPADM

## 2023-12-05 NOTE — TELEPHONE ENCOUNTER
Patient had been on medication for restless legs in the past, does not know the name of medication. Could MD order something for his restless legs as he is having trouble sleeping.

## 2023-12-06 VITALS
SYSTOLIC BLOOD PRESSURE: 110 MMHG | RESPIRATION RATE: 16 BRPM | HEART RATE: 79 BPM | DIASTOLIC BLOOD PRESSURE: 68 MMHG | OXYGEN SATURATION: 95 % | TEMPERATURE: 97.9 F

## 2023-12-06 RX ORDER — PRAMIPEXOLE DIHYDROCHLORIDE 0.5 MG/1
0.5 TABLET ORAL
Qty: 30 TABLET | Refills: 2 | Status: SHIPPED | OUTPATIENT
Start: 2023-12-06

## 2023-12-06 NOTE — ED NOTES
CIS informed Elly Keep from [facility] that Pt will be transported @ 7:30am and should arrive around 9am. Griseofulvin Pregnancy And Lactation Text: This medication is Pregnancy Category X and is known to cause serious birth defects. It is unknown if this medication is excreted in breast milk but breast feeding should be avoided.

## 2023-12-06 NOTE — ED NOTES
Patient is accepted at [facility]  Patient is accepted by Dr. Reg Jackson per [address] is arranged with [facility]  Transportation is scheduled for 7:30am  Patient may go to the floor after 12am    Nurse report is to be called to [phone] prior to patient transfer.

## 2023-12-06 NOTE — EMTALA/ACUTE CARE TRANSFER
[address] [facility]  [address] [facility] [address]  Dept: [phone]      EMTALA TRANSFER CONSENT    NAME Alverto Galvez                                         1970                              MRN [id number]    I have been informed of my rights regarding examination, treatment, and transfer   by Dr. Jose De Jesus Sierra: Specialized equipment and/or services available at the receiving facility (Include comment)________________________    Risks:        Consent for Transfer:  I acknowledge that my medical condition has been evaluated and explained to me by the emergency department physician or other qualified medical person and/or my attending physician, who has recommended that I be transferred to the service of  Accepting Physician: Dr. Espinal Settler at [address] [place] [address] [address] Name, [facility]. The above potential benefits of such transfer, the potential risks associated with such transfer, and the probable risks of not being transferred have been explained to me, and I fully understand them. The doctor has explained that, in my case, the benefits of transfer outweigh the risks. I agree to be transferred. I authorize the performance of emergency medical procedures and treatments upon me in both transit and upon arrival at the receiving facility. Additionally, I authorize the release of any and all medical records to the receiving facility and request they be transported with me, if possible. I understand that the safest mode of transportation during a medical emergency is an ambulance and that the Hospital advocates the use of this mode of transport. Risks of traveling to the receiving facility by car, including absence of medical control, life sustaining equipment, such as oxygen, and medical personnel has been explained to me and I fully understand them.     (MORALES CORRECT BOX BELOW)  [  ]  I consent to the stated transfer and to be transported by ambulance/helicopter. [  ]  I consent to the stated transfer, but refuse transportation by ambulance and accept full responsibility for my transportation by car. I understand the risks of non-ambulance transfers and I exonerate the Hospital and its staff from any deterioration in my condition that results from this refusal.    X___________________________________________    DATE  23  TIME________  Signature of patient or legally responsible individual signing on patient behalf           RELATIONSHIP TO PATIENT_________________________          Provider Certification    NAME Jerman Merida                                         1970                              MRN [id number]    A medical screening exam was performed on the above named patient. Based on the examination:    Condition Necessitating Transfer The encounter diagnosis was Suicidal ideations. Patient Condition: The patient has been stabilized such that within reasonable medical probability, no material deterioration of the patient condition or the condition of the unborn child(anthony) is likely to result from the transfer    Reason for Transfer: Level of Care needed not available at this facility    Transfer Requirements: [facility] [place]  [address] available and qualified personnel available for treatment as acknowledged by Michelle Salazar [phone]  Agreed to accept transfer and to provide appropriate medical treatment as acknowledged by       Dr. Deanna Figueroa  Appropriate medical records of the examination and treatment of the patient are provided at the time of transfer   [address] _______  Transfer will be performed by qualified personnel from [facility]  and appropriate transfer equipment as required, including the use of necessary and appropriate life support measures.     Provider Certification: I have examined the patient and explained the following risks and benefits of being transferred/refusing transfer to the patient/family:  General risk, such as traffic hazards, adverse weather conditions, rough terrain or turbulence, possible failure of equipment (including vehicle or aircraft), or consequences of actions of persons outside the control of the transport personnel      Based on these reasonable risks and benefits to the patient and/or the unborn child(anthony), and based upon the information available at the time of the patient’s examination, I certify that the medical benefits reasonably to be expected from the provision of appropriate medical treatments at another medical facility outweigh the increasing risks, if any, to the individual’s medical condition, and in the case of labor to the unborn child, from effecting the transfer.     X____________________________________________ DATE 12/05/23        TIME_______      ORIGINAL - SEND TO MEDICAL RECORDS   COPY - SEND WITH PATIENT DURING TRANSFER

## 2023-12-06 NOTE — ED NOTES
CIS informed pt that pt is going to [facility] and waiting on transport time. Pt in agreement.

## 2023-12-06 NOTE — ED PROVIDER NOTES
History  Chief Complaint   Patient presents with    Psychiatric Evaluation     Reports he has been having increased depression and today he felt suicidal and thought about slitting his wrist at work. This is a 44-year-old male with past medical history significant for depression presenting to the emergency department today for suicidal ideations. The patient notes he has had suicidal ideations for numerous years. Earlier today, the patient had suicidal ideations with a plan to cut his wrists while at work. He notes he has access to sharp objects while at work but did not cut his wrists because he did not want to make a mess. The patient then called a crisis hotline who referred him to the emergency department. He notes chronic depression and has been taking his medications daily. There are no recent changes to his medication regimen. He notes remote inpatient hospitalization for psychiatric purposes. He has no homicidal ideations or hallucinations. No fevers or chills. The patient denies other complaints at this time. History provided by:  Patient   used: No    Psychiatric Evaluation  Presenting symptoms: depression and suicidal thoughts    Presenting symptoms: no aggressive behavior, no agitation, no delusions, no hallucinations, no homicidal ideas, no self-mutilation, no suicidal threats and no suicide attempt    Degree of incapacity (severity): Moderate  Onset quality:  Gradual  Duration: since earlier today. Timing:  Intermittent  Progression:  Waxing and waning  Chronicity:  Recurrent  Treatment compliance: All of the time  Relieved by:  Nothing  Exacerbated by: life stressors.   Ineffective treatments:  Antidepressants  Associated symptoms: anhedonia, anxiety and feelings of worthlessness    Associated symptoms: no abdominal pain, no appetite change, no chest pain, no decreased need for sleep, no euphoric mood, no fatigue, no insomnia, no irritability, no school problems and no weight change        Prior to Admission Medications   Prescriptions Last Dose Informant Patient Reported? Taking? Ascorbic Acid (vitamin C) 1000 MG tablet   Yes No   Sig: Take 1,000 mg by mouth daily   Omega-3 Fatty Acids (Fish Oil) 1200 MG CAPS   Yes No   Sig: Take 1,200 mg by mouth in the morning   atorvastatin (LIPITOR) 40 mg tablet   No No   Sig: Take 1 tablet (40 mg total) by mouth daily   escitalopram (LEXAPRO) 20 mg tablet   No No   Sig: Take 1 tablet (20 mg total) by mouth daily   gabapentin (NEURONTIN) 300 mg capsule   No No   Sig: Take 1 capsule (300 mg total) by mouth daily   lisdexamfetamine (VYVANSE) 70 MG capsule   No No   Sig: Take 1 capsule (70 mg total) by mouth every morning Max Daily Amount: 70 mg   metFORMIN (GLUCOPHAGE-XR) 750 mg 24 hr tablet   No No   Sig: Take 1 tablet (750 mg total) by mouth daily with breakfast   omeprazole (PriLOSEC) 20 mg delayed release capsule   Yes No   Sig: Take 20 mg by mouth daily   pramipexole (MIRAPEX) 0.5 mg tablet   Yes No   Sig: Take 0.5 mg by mouth 3 (three) times a day   sildenafil (VIAGRA) 100 mg tablet   No No   Sig: Take 1 tablet (100 mg total) by mouth daily as needed for erectile dysfunction   vardenafil (LEVITRA) 20 MG tablet   No No   Sig: Take 1 tablet (20 mg total) by mouth daily as needed for erectile dysfunction   zinc gluconate 50 mg tablet   Yes No   Sig: Take 50 mg by mouth daily      Facility-Administered Medications: None       History reviewed. No pertinent past medical history. History reviewed. No pertinent surgical history. History reviewed. No pertinent family history. I have reviewed and agree with the history as documented.     E-Cigarette/Vaping     E-Cigarette/Vaping Substances     Social History     Tobacco Use    Smoking status: Never    Smokeless tobacco: Never   Substance Use Topics    Alcohol use: Never    Drug use: Never       Review of Systems   Constitutional:  Negative for appetite change, chills, diaphoresis, fatigue, fever and irritability. Eyes:  Negative for visual disturbance. Respiratory:  Negative for cough, chest tightness, shortness of breath and wheezing. Cardiovascular:  Negative for chest pain, palpitations and leg swelling. Gastrointestinal:  Negative for abdominal pain, constipation, diarrhea, nausea and vomiting. Psychiatric/Behavioral:  Positive for suicidal ideas. Negative for agitation, hallucinations, homicidal ideas and self-injury. The patient is nervous/anxious. The patient does not have insomnia. All other systems reviewed and are negative. Physical Exam  Physical Exam  Vitals and nursing note reviewed. Constitutional:       General: He is not in acute distress. Appearance: Normal appearance. He is normal weight. He is not ill-appearing, toxic-appearing or diaphoretic. Comments: Tearful at times   HENT:      Head: Normocephalic and atraumatic. Nose: Nose normal. No congestion or rhinorrhea. Mouth/Throat:      Mouth: Mucous membranes are moist.      Pharynx: No oropharyngeal exudate or posterior oropharyngeal erythema. Eyes:      General: No scleral icterus. Right eye: No discharge. Left eye: No discharge. Conjunctiva/sclera: Conjunctivae normal.   Cardiovascular:      Rate and Rhythm: Normal rate and regular rhythm. Pulses: Normal pulses. Heart sounds: Normal heart sounds. No murmur heard. No friction rub. No gallop. Pulmonary:      Effort: Pulmonary effort is normal. No respiratory distress. Breath sounds: Normal breath sounds. No stridor. No wheezing, rhonchi or rales. Chest:      Chest wall: No tenderness. Musculoskeletal:         General: Normal range of motion. Cervical back: Normal range of motion. No rigidity. Right lower leg: No edema. Left lower leg: No edema. Skin:     General: Skin is warm and dry. Capillary Refill: Capillary refill takes less than 2 seconds. Coloration: Skin is not jaundiced or pale. Neurological:      General: No focal deficit present. Mental Status: He is alert and oriented to person, place, and time. Mental status is at baseline.    Psychiatric:         Mood and Affect: Mood normal.         Behavior: Behavior normal.      Comments: + SI with a plan  - HI  - Hallucination         Vital Signs  ED Triage Vitals   Temperature Pulse Respirations Blood Pressure SpO2   12/05/23 1749 12/05/23 1749 12/05/23 1749 12/05/23 1749 12/05/23 1749   97.9 °F (36.6 °C) 76 18 126/65 95 %      Temp Source Heart Rate Source Patient Position - Orthostatic VS BP Location FiO2 (%)   12/05/23 1749 12/05/23 1749 12/05/23 1749 12/05/23 1749 --   Oral Monitor Sitting Right arm       Pain Score       12/05/23 1914       No Pain           Vitals:    12/05/23 1749   BP: 126/65   Pulse: 76   Patient Position - Orthostatic VS: Sitting         Visual Acuity      ED Medications  Medications   ascorbic acid (VITAMIN C) tablet 1,000 mg (has no administration in time range)   atorvastatin (LIPITOR) tablet 40 mg (has no administration in time range)   escitalopram (LEXAPRO) tablet 20 mg (has no administration in time range)   gabapentin (NEURONTIN) capsule 300 mg (has no administration in time range)   metFORMIN (GLUCOPHAGE-XR) 24 hr tablet 750 mg (has no administration in time range)   pramipexole (MIRAPEX) tablet 0.5 mg (has no administration in time range)   zinc gluconate tablet 50 mg (has no administration in time range)       Diagnostic Studies  Results Reviewed       Procedure Component Value Units Date/Time    TSH [[id number]]  (Normal) Collected: 12/05/23 1837    Lab Status: Final result Specimen: Blood from Arm, Right Updated: 12/05/23 1912     TSH 3RD GENERATON 2.522 uIU/mL     Rapid drug screen, urine [[id number]]  (Normal) Collected: 12/05/23 1849    Lab Status: Final result Specimen: Urine, Clean Catch Updated: 12/05/23 1908     Amph/Meth UR Negative     Barbiturate Ur Negative     Benzodiazepine Urine Negative     Cocaine Urine Negative     Methadone Urine Negative     Opiate Urine Negative     PCP Ur Negative     THC Urine Negative     Oxycodone Urine Negative    Narrative:      FOR MEDICAL PURPOSES ONLY. IF CONFIRMATION NEEDED PLEASE CONTACT THE LAB WITHIN 5 DAYS.     Drug Screen Cutoff Levels:  AMPHETAMINE/METHAMPHETAMINES  1000 ng/mL  BARBITURATES     200 ng/mL  BENZODIAZEPINES     200 ng/mL  COCAINE      300 ng/mL  METHADONE      300 ng/mL  OPIATES      300 ng/mL  PHENCYCLIDINE     25 ng/mL  THC       50 ng/mL  OXYCODONE      100 ng/mL    Comprehensive metabolic panel [[id number]] Collected: 12/05/23 1837    Lab Status: Final result Specimen: Blood from Arm, Right Updated: 12/05/23 1900     Sodium 139 mmol/L      Potassium 3.5 mmol/L      Chloride 104 mmol/L      CO2 26 mmol/L      ANION GAP 9 mmol/L      BUN 12 mg/dL      Creatinine 0.90 mg/dL      Glucose 90 mg/dL      Calcium 8.9 mg/dL      AST 21 U/L      ALT 18 U/L      Alkaline Phosphatase 89 U/L      Total Protein 6.5 g/dL      Albumin 3.9 g/dL      Total Bilirubin 0.65 mg/dL      eGFR 98 ml/min/1.73sq m     Narrative:      [facility] guidelines for Chronic Kidney Disease (CKD):     Stage 1 with normal or high GFR (GFR > 90 mL/min/1.73 square meters)    Stage 2 Mild CKD (GFR = 60-89 mL/min/1.73 square meters)    Stage 3A Moderate CKD (GFR = 45-59 mL/min/1.73 square meters)    Stage 3B Moderate CKD (GFR = 30-44 mL/min/1.73 square meters)    Stage 4 Severe CKD (GFR = 15-29 mL/min/1.73 square meters)    Stage 5 End Stage CKD (GFR <15 mL/min/1.73 square meters)  Note: GFR calculation is accurate only with a steady state creatinine    Ethanol [[id number]]  (Normal) Collected: 12/05/23 1837    Lab Status: Final result Specimen: Blood from Arm, Right Updated: 12/05/23 1900     Ethanol Lvl <10 mg/dL     UA w Reflex to Microscopic w Reflex to Culture [[id number]]  (Normal) Collected: 12/05/23 1849    Lab Status: Final result Specimen: Urine, Clean Catch Updated: 12/05/23 1855     Color, UA Yellow     Clarity, UA Clear     Specific Gravity, UA 1.020     pH, UA 7.0     Leukocytes, UA Negative     Nitrite, UA Negative     Protein, UA Negative mg/dl      Glucose, UA Negative mg/dl      Ketones, UA Negative mg/dl      Urobilinogen, UA 1.0 E.U./dl      Bilirubin, UA Negative     Occult Blood, UA Negative    CBC and differential [[id number]]  (Abnormal) Collected: 12/05/23 1837    Lab Status: Final result Specimen: Blood from Arm, Right Updated: 12/05/23 1841     WBC 5.41 Thousand/uL      RBC 4.70 Million/uL      Hemoglobin 14.7 g/dL      Hematocrit 42.4 %      MCV 90 fL      MCH 31.3 pg      MCHC 34.7 g/dL      RDW 11.9 %      MPV 8.7 fL      Platelets 383 Thousands/uL      nRBC 0 /100 WBCs      Neutrophils Relative 44 %      Immat GRANS % 0 %      Lymphocytes Relative 40 %      Monocytes Relative 13 %      Eosinophils Relative 2 %      Basophils Relative 1 %      Neutrophils Absolute 2.38 Thousands/µL      Immature Grans Absolute 0.01 Thousand/uL      Lymphocytes Absolute 2.16 Thousands/µL      Monocytes Absolute 0.71 Thousand/µL      Eosinophils Absolute 0.12 Thousand/µL      Basophils Absolute 0.03 Thousands/µL                    No orders to display              Procedures  ECG 12 Lead Documentation Only    Date/Time: 12/5/2023 6:26 PM    Performed by: Kristin Mixon PA-C  Authorized by: Kristin Mixon PA-C    Indications / Diagnosis:  [facility] Psych Workup  Patient location:  ED  Previous ECG:     Previous ECG:  Unavailable  Interpretation:     Interpretation: non-specific    Rate:     ECG rate:  62    ECG rate assessment: normal    Rhythm:     Rhythm: sinus rhythm    Ectopy:     Ectopy: none    QRS:     QRS axis:  Normal  Conduction:     Conduction: normal    ST segments:     ST segments:  Normal  Comments:      Normal sinus rhythm with a rate of 62. Incomplete right bundle branch block. Normal axis. No ectopy. No STEMI. ED Course  ED Course as of 12/05/23 2010   Tue Dec 05, 2023   0659 The patient is medically cleared for crisis evaluation. SBIRT 22yo+      Flowsheet Row Most Recent Value   Initial Alcohol Screen: US AUDIT-C     1. How often do you have a drink containing alcohol? 2 Filed at: 12/05/2023 1756   2. How many drinks containing alcohol do you have on a typical day you are drinking? 1 Filed at: 12/05/2023 1756   3a. Male UNDER 65: How often do you have five or more drinks on one occasion? 0 Filed at: 12/05/2023 1756   3b. FEMALE Any Age, or MALE 65+: How often do you have 4 or more drinks on one occassion? 0 Filed at: 12/05/2023 1756   Audit-C Score 3 Filed at: 12/05/2023 1756   SHARRON: How many times in the past year have you. .. Used an illegal drug or used a prescription medication for non-medical reasons? Never Filed at: 12/05/2023 1756                      Medical Decision Making  53-year-old male presenting to the emergency department today for suicidal ideations with a plan. History of depression. Vital signs are stable. Afebrile. On physical examination, the patient endorses suicidal ideations with a plan but no homicidal ideations. EKG shows normal sinus rhythm with a rate of 62 with an incomplete right bundle branch block. Labs are reassuring. Patient has not drunk. The patient was seen by Alverto Bryant with crisis intervention; the patient signed a 201. Pending bed placement. The patient and/or patient's proxy verify their understanding and agree to the plan at this time. All questions answered to the patient and/or their proxy's satisfaction. All labs reviewed and utilized in the medical decision making process (if labs were ordered). Portions of the record may have been created with voice recognition software.   Occasional wrong word or "sound a like" substitutions may have occurred due to the inherent limitations of voice recognition software. Read the chart carefully and recognize, using context, where substitutions have occurred. Case discussed with significant other at bedside. Problems Addressed:  Suicidal ideations: undiagnosed new problem with uncertain prognosis    Amount and/or Complexity of Data Reviewed  Independent Historian: spouse  Labs: ordered. Decision-making details documented in ED Course. ECG/medicine tests: ordered and independent interpretation performed. Decision-making details documented in ED Course. Discussion of management or test interpretation with external provider(s): Emily Chappellp - Crisis Intervention    Risk  OTC drugs. Prescription drug management. Decision regarding hospitalization. Disposition  Final diagnoses:   Suicidal ideations     Time reflects when diagnosis was documented in both MDM as applicable and the Disposition within this note       Time User Action Codes Description Comment    12/5/2023  7:21 PM Shilo Rios Add [R45.851] Suicidal ideations           ED Disposition       ED Disposition   Transfer to [address] [place] [address]   --    Date/Time   Tue Dec 5, 2023 [facility] should be transferred out to a psychiatric facility and has been medically cleared. Follow-up Information    None         Patient's Medications   Discharge Prescriptions    No medications on file       No discharge procedures on file.     PDMP Review         Value Time User    PDMP Reviewed  Yes 11/20/2023  2:58 PM Munir Olivera MD            ED Provider  Electronically Signed by             Carley Richardson PA-C  12/05/23 2020

## 2023-12-06 NOTE — ED CARE HANDOFF
Emergency Department Sign Out Note        Signout and transfer of care from my colleague, Dr. Frantz Miranda. See Separate Emergency Department note. The patient, Jodie Vargas, was evaluated for suicidal ideation. Labs Reviewed   CBC AND DIFFERENTIAL - Abnormal       Result Value Ref Range Status    WBC 5.41  4.31 - 10.16 Thousand/uL Final    RBC 4.70  3.88 - 5.62 Million/uL Final    Hemoglobin 14.7  12.0 - 17.0 g/dL Final    Hematocrit 42.4  36.5 - 49.3 % Final    MCV 90  82 - 98 fL Final    MCH 31.3  26.8 - 34.3 pg Final    MCHC 34.7  31.4 - 37.4 g/dL Final    RDW 11.9  11.6 - 15.1 % Final    MPV 8.7 (*) 8.9 - 12.7 fL Final    Platelets 344  866 - 390 Thousands/uL Final    nRBC 0  /100 WBCs Final    Neutrophils Relative 44  43 - 75 % Final    Immat GRANS % 0  0 - 2 % Final    Lymphocytes Relative 40  14 - 44 % Final    Monocytes Relative 13 (*) 4 - 12 % Final    Eosinophils Relative 2  0 - 6 % Final    Basophils Relative 1  0 - 1 % Final    Neutrophils Absolute 2.38  1.85 - 7.62 Thousands/µL Final    Immature Grans Absolute 0.01  0.00 - 0.20 Thousand/uL Final    Lymphocytes Absolute 2.16  0.60 - 4.47 Thousands/µL Final    Monocytes Absolute 0.71  0.17 - 1.22 Thousand/µL Final    Eosinophils Absolute 0.12  0.00 - 0.61 Thousand/µL Final    Basophils Absolute 0.03  0.00 - 0.10 Thousands/µL Final   RAPID DRUG SCREEN, URINE - Normal    Amph/Meth UR Negative  Negative Final    Barbiturate Ur Negative  Negative Final    Benzodiazepine Urine Negative  Negative Final    Cocaine Urine Negative  Negative Final    Methadone Urine Negative  Negative Final    Opiate Urine Negative  Negative Final    PCP Ur Negative  Negative Final    THC Urine Negative  Negative Final    Oxycodone Urine Negative  Negative Final    Narrative:     FOR MEDICAL PURPOSES ONLY. IF CONFIRMATION NEEDED PLEASE CONTACT THE LAB WITHIN 5 DAYS.     Drug Screen Cutoff Levels:  AMPHETAMINE/METHAMPHETAMINES  1000 ng/mL  BARBITURATES     200 ng/mL  BENZODIAZEPINES     200 ng/mL  COCAINE      300 ng/mL  METHADONE      300 ng/mL  OPIATES      300 ng/mL  PHENCYCLIDINE     25 ng/mL  THC       50 ng/mL  OXYCODONE      100 ng/mL   TSH, 3RD GENERATION - Normal    TSH 3RD GENERATON 2.522  0.450 - 4.500 uIU/mL Final    Comment: Adult TSH (3rd generation) reference range follows the recommended guidelines of the American Thyroid Association, January, 2020. MEDICAL ALCOHOL - Normal    Ethanol Lvl <10  <10 mg/dL Final   UA W REFLEX TO MICROSCOPIC WITH REFLEX TO CULTURE - Normal    Color, UA Yellow  Yellow Final    Clarity, UA Clear  Clear Final    Specific Gravity, UA 1.020  1.001 - 1.030 Final    pH, UA 7.0  5.0, 5.5, 6.0, 6.5, 7.0, 7.5, 8.0 Final    Leukocytes, UA Negative  Negative Final    Nitrite, UA Negative  Negative Final    Protein, UA Negative  Negative, Interference- unable to analyze mg/dl Final    Glucose, UA Negative  Negative mg/dl Final    Ketones, UA Negative  Negative mg/dl Final    Urobilinogen, UA 1.0  0.2, 1.0 E.U./dl E.U./dl Final    Bilirubin, UA Negative  Negative Final    Occult Blood, UA Negative  Negative Final   COMPREHENSIVE METABOLIC PANEL    Sodium 703  135 - 147 mmol/L Final    Potassium 3.5  3.5 - 5.3 mmol/L Final    Chloride 104  96 - 108 mmol/L Final    CO2 26  21 - 32 mmol/L Final    ANION GAP 9  mmol/L Final    BUN 12  5 - 25 mg/dL Final    Creatinine 0.90  0.60 - 1.30 mg/dL Final    Comment: Standardized to IDMS reference method    Glucose 90  65 - 140 mg/dL Final    Comment: If the patient is fasting, the ADA then defines impaired fasting glucose as > 100 mg/dL and diabetes as > or equal to 123 mg/dL. Calcium 8.9  8.4 - 10.2 mg/dL Final    AST 21  13 - 39 U/L Final    ALT 18  7 - 52 U/L Final    Comment: Specimen collection should occur prior to Sulfasalazine administration due to the potential for falsely depressed results.      Alkaline Phosphatase 89  34 - 104 U/L Final    Total Protein 6.5  6.4 - 8.4 g/dL Final Albumin 3.9  3.5 - 5.0 g/dL Final    Total Bilirubin 0.65  0.20 - 1.00 mg/dL Final    Comment: Use of this assay is not recommended for patients undergoing treatment with eltrombopag due to the potential for falsely elevated results. N-acetyl-p-benzoquinone imine (metabolite of Acetaminophen) will generate erroneously low results in samples for patients that have taken an overdose of Acetaminophen. eGFR 98  ml/min/1.73sq m Final    Narrative:     [facility] guidelines for Chronic Kidney Disease (CKD):     Stage 1 with normal or high GFR (GFR > 90 mL/min/1.73 square meters)    Stage 2 Mild CKD (GFR = 60-89 mL/min/1.73 square meters)    Stage 3A Moderate CKD (GFR = 45-59 mL/min/1.73 square meters)    Stage 3B Moderate CKD (GFR = 30-44 mL/min/1.73 square meters)    Stage 4 Severe CKD (GFR = 15-29 mL/min/1.73 square meters)    Stage 5 End Stage CKD (GFR <15 mL/min/1.73 square meters)  Note: GFR calculation is accurate only with a steady state creatinine       Patient is medically cleared, with plan for transfer to [place](Dr. Jonah Stroud). Patient was transferred. Procedures  Medical Decision Making  Amount and/or Complexity of Data Reviewed  Labs: ordered. Risk  OTC drugs. Prescription drug management. Decision regarding hospitalization. Disposition  Final diagnoses:   Suicidal ideations     Time reflects when diagnosis was documented in both MDM as applicable and the Disposition within this note       Time User Action Codes Description Comment    12/5/2023  7:21 PM Jatin Rios [facility] Add [R45.851] Suicidal ideations           ED Disposition       ED Disposition   Transfer to [address] [place] [address]   --    Date/Time   Tue Dec 5, 2023 11:40 PM    Comment   Malick Sterling should be transferred out to [facility], accepted by Dr. Wu Strickland and has been medically cleared.                MD Documentation      Flowsheet Row Most Recent Value   Patient Condition The patient has been stabilized such that within reasonable medical probability, no material deterioration of the patient condition or the condition of the unborn child(anthony) is likely to result from the transfer   Reason for Transfer Level of Care needed not available at this facility   Benefits of Transfer Specialized equipment and/or services available at the receiving facility (Include comment)________________________   Accepting Physician Dr. Jessica Madera Name, [facility]    [phone]   Transported by (Company and Unit #) [employer]   Sending MD Dr. Katiana Harper   Provider Certification General risk, such as traffic hazards, adverse weather conditions, rough terrain or turbulence, possible failure of equipment (including vehicle or aircraft), or consequences of actions of persons outside the control of the transport personnel          RN Documentation      Flowsheet Row Most [facility] Name, [facility]    (Name & Tel number) Ashley Kraus [phone]   Transport Mode Other (Comment)  [Transport Van]   Transported by [employer] and Unit #) [facility]          Follow-up Information    None       Patient's Medications   Discharge Prescriptions    No medications on file     No discharge procedures on file.        ED Provider  Electronically Signed by     Alivia Velasquez MD  12/06/23 0132       Alivia Velasquez MD  12/06/23 6834       Alivia Velasquez MD  12/06/23 1987

## 2023-12-06 NOTE — ED NOTES
Bed Search:     Intake per [facility]-No beds  [facility] will review (201 and clinicals faxed)  Wonda Scheuermann will review for bed tomorrow (201 and clinicals faxed)
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
## 2023-12-06 NOTE — ED NOTES
Jacky from [employer] with [place]. Nessa Jackson will contact [facility]IS with transport time.

## 2023-12-09 LAB
ATRIAL RATE: 62 BPM
P AXIS: 51 DEGREES
PR INTERVAL: 166 MS
QRS AXIS: 24 DEGREES
QRSD INTERVAL: 100 MS
QT INTERVAL: 418 MS
QTC INTERVAL: 424 MS
T WAVE AXIS: 44 DEGREES
VENTRICULAR RATE: 62 BPM

## 2023-12-13 ENCOUNTER — TELEPHONE (OUTPATIENT)
Dept: PSYCHIATRY | Facility: CLINIC | Age: 53
End: 2023-12-13

## 2023-12-13 NOTE — TELEPHONE ENCOUNTER
Patient called to seek services. Writer explained the wait list, pt said the wait is too long. Writer shared can send out outside resource packet to find services.  Writer emailed packet to pt and did not add pt to wait list.

## 2023-12-15 ENCOUNTER — TELEPHONE (OUTPATIENT)
Dept: HEMATOLOGY ONCOLOGY | Facility: CLINIC | Age: 53
End: 2023-12-15

## 2023-12-15 NOTE — TELEPHONE ENCOUNTER
Call out to patient informing of need for appointment change as provider is no longer at [facility]. Gave new time and date of appointment with Dr Anibal Johnson at [facility]. Address for [place] was given as well as instructions on where to arrive. [employer] number was left twice for patient if appointment does not work out for patient or he has questions.

## 2023-12-15 NOTE — PROGRESS NOTES
Richard Rogers  1970  [address] [facility] [place]  [address] [facility]  [place] [address]    Chief Complaint   Patient presents with    Consult     Assessment/plan:   1.  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  CHAI.  Diagnosed January 2023 at [facility] in [facility]  2.  Secondary polycythemia-resolved with CPAP  3.  Acute DVT left popliteal vein 3/30/2023- s/p Xarelto stopped October 2023.    Richard Rogers is a 53-year-old male with past medical history significant for hyperlipidemia, prediabetes, secondary polycythemia which was resolved with CPAP and stage III rectosigmoid carcinoma.  He was previously following with [facility] [place] in [facility].  He was initially experiencing altered bowel movements for about 8 months as well as some blood in the stool.  This prompted further workup.  Colonoscopy showed a rectosigmoid mass at about 18 cm and the scope could not be passed further.  Biopsy showed adenocarcinoma, MSI was negative.  CEA was 54.2.  CT of the chest/abdomen/pelvis was negative.  His lesion was determined to be above the peritoneal reflection.  He underwent robot-assisted LAR on 1/18/2023 at  [facility].  Pathology showed the tumor invaded through the muscularis propria with 5/36 lymph nodes involved as well as 3 tumor deposits.  MSI was negative.  All margins were negative.  He received adjuvant FOLFOX x 12 completed 7/2023.  His CT of the chest/abdomen/pelvis was on 8/23/2023 with no findings suggestive of metastatic disease.  He was diagnosed with DVT of the left popliteal vein in March 2023 and was on Xarelto until October 2023.  He was previously following with oncology (Dr. Shaun Pruitt) at the  [facility] in [facility] and last office visit was 9/1/2023.  Patient now relocated to  [place].       Patient continues to do well from oncology perspective and will continue active surveillance.  We discussed surveillance per NCCN guidelines as follows:   History and physical examination every 3-6 mo for 2 y, then every 6 mo for a total of 5 y    CEA every 3-6 mo for 2 y, then every 6 mo for a total of 5 y    Chest/abdominal/pelvic CT every 6-12 mo (category 2B for frequency <12 mo) from date of surgery for a total of 5 y    Colonoscopya in 1 y after surgery except if no preoperative colonoscopy due to obstructing lesion, colonoscopy in 3-6 mo If advanced adenoma, repeat in 1 y If no advanced adenoma, repeat in 3 y, then every 5 y   PET/CT scan is not indicated    Principles of Survivorship (COL-H)    Last colonoscopy was 8/2023 in [place]. Pt notes having ulcer.  Advised patient to establish care with GI here.  Will have office staff request pathology for review at [facility].  We will check updated labs including CEA.  He will follow-up in 2 months with CT of the chest/abdomen and pelvis for surveillance.    4.  Chemo-Port  Discussed removal of Chemo-Port, however patient prefers to keep it in for now.  This has not been flushed in several months.  We discussed importance of having it flushed on a regular basis.  He will be scheduled for Chemo-Port flush.      Oncology history:   Primary Diagnosis:  1.  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  CHAI.  Diagnosed January 2023 at [facility] in [facility]  2.  Secondary polycythemia-resolved with CPAP  3.  Acute DVT left popliteal vein 3/30/2023- s/p Xarelto stopped October 2023.      Previous Hematologic/ Oncologic Treatment:   1.  S/p robot-assisted LAR 1/18/2023 by Dr. Whitehead at [place]  2.  FOLFOX x 12 cycles completed 7/2023     Current Hematologic/ Oncologic Treatment:    Observation     Test Results:  Pathology:  12/21/2022: From prior  notes  Pathologic stage IIIB (AJCC V8).  TNM: pT3, pN2A, cM0; CEA: 54.2 ng/mL  Resectability: Unresectable; bowel obstruction: Not present; no central tumor: R0; circumferential resection margin (EMR): Negative; histologic grade: G2; number of lymph nodes dissected: 36, number of positive lymph nodes: 5; lymphovascular invasion: Present; perineural invasion: Not present     Radiology:  8/23/2023: CT chest/abdomen/pelvis at [facility]:  No acute findings and no evidence of metastatic disease of the chest, abdomen or pelvis.    Laboratory:  2/6/2023: CEA: 4.9  3/7/2023: CEA: 1.3  5/2/2023: CEA: 2.0  5/15/2023: CEA: 2.1  5/30/2023: CEA: 2.1  6/27/2023: CEA: 3.0  7/26/2023: CEA: 3.3    History of present illness:   Richard Rogers is a 53-year-old male with past medical history significant for hyperlipidemia, prediabetes, secondary polycythemia which was resolved with CPAP and stage III rectosigmoid carcinoma.  He was previously following with [facility] in [facility].  He was initially experiencing altered bowel movements for about 8 months as well as some blood in the stool.  This prompted further workup.  Colonoscopy showed a rectosigmoid mass at about 18 cm and the scope could not be passed further.  Biopsy showed adenocarcinoma, MSI was negative.  CEA was 54.2.  CT of the chest/abdomen/pelvis was negative.  His lesion was determined to be above the peritoneal reflection.  He underwent robot-assisted LAR on 1/18/2023 at  [facility].  Pathology showed the tumor invaded through the muscularis propria with 5/36 lymph nodes involved as well as 3 tumor deposits.  MSI was negative.  All margins were negative.  He received adjuvant FOLFOX x 12 completed 7/2023.  His CT of the chest/abdomen/pelvis was on 8/23/2023 with no findings suggestive of metastatic disease.  He was diagnosed with DVT of the left popliteal vein in March 2023 and was on Xarelto.  He was  previously following with oncology (Dr. Shaun Pruitt) at the [facility] in [facility] and last office visit was 9/1/2023.  Patient now relocated to [place].  He presents for initial consultation.    Last colonoscopy was 8/2023 in [place]. Pt notes having ulcer.  He denies any abdominal pain, nausea, vomiting, diarrhea.  Denies any dark stools, blood in his stools or other complaints.  Denies any respiratory symptoms.  He has a Chemo-Port in place which has not been flushed for several months.    His family history is positive for mother having and cancer, 1 maternal aunt had lung cancer, 1 maternal aunt had colon cancer, 1 maternal aunt had pancreatic cancer and 1 had breast cancer.    Review of systems:   Review of Systems   Constitutional:  Negative for chills and fever.   Eyes:  Negative for pain and visual disturbance.   Respiratory:  Negative for cough and shortness of breath.    Cardiovascular:  Negative for chest pain and palpitations.   Gastrointestinal:  Negative for abdominal pain and vomiting.   Genitourinary:  Negative for dysuria and hematuria.   Musculoskeletal:  Negative for arthralgias and back pain.   Skin:  Negative for color change and rash.   Neurological:  Negative for seizures and syncope.   All other systems reviewed and are negative.      Patient Active Problem List   Diagnosis    Colon cancer metastasized to intra-abdominal lymph node (HCC)    Current mild episode of major depressive disorder without prior episode (HCC)    Lumbar radiculopathy    Attention deficit hyperactivity disorder (ADHD), predominantly inattentive type    Neuropathy due to chemotherapeutic drug     Prediabetes    Other male erectile dysfunction    Mixed hyperlipidemia     Past Medical History:   Diagnosis Date    Depression     Sleep difficulties      No past surgical history on file.  No family history on file.  Social History     Socioeconomic History    Marital status:  Single     Spouse name: Not on file    Number of children: Not on file    Years of education: Not on file    Highest education level: Not on file   Occupational History    Not on file   Tobacco Use    Smoking status: Never    Smokeless tobacco: Never   Substance and Sexual Activity    Alcohol use: Never    Drug use: Never    Sexual activity: Not on file   Other Topics Concern    Not on file   Social History Narrative    Not on file     Social Determinants of Health     Financial Resource Strain: Not on file   Food Insecurity: Not on file   Transportation Needs: Not on file   Physical Activity: Not on file   Stress: Not on file   Social Connections: Not on file   Intimate Partner Violence: Not on file   Housing Stability: Not on file       Current Outpatient Medications:     Ascorbic Acid (vitamin C) 1000 MG tablet, Take 1,000 mg by mouth daily, Disp: , Rfl:     atorvastatin (LIPITOR) 40 mg tablet, Take 1 tablet (40 mg total) by mouth daily, Disp: 90 tablet, Rfl: 1    escitalopram (LEXAPRO) 20 mg tablet, Take 1 tablet (20 mg total) by mouth daily, Disp: 30 tablet, Rfl: 0    gabapentin (NEURONTIN) 300 mg capsule, Take 1 capsule (300 mg total) by mouth daily, Disp: 30 capsule, Rfl: 5    lisdexamfetamine (VYVANSE) 70 MG capsule, Take 1 capsule (70 mg total) by mouth every morning Max Daily Amount: 70 mg, Disp: 30 capsule, Rfl: 0    metFORMIN (GLUCOPHAGE-XR) 750 mg 24 hr tablet, Take 1 tablet (750 mg total) by mouth daily with breakfast, Disp: 90 tablet, Rfl: 1    Omega-3 Fatty Acids (Fish Oil) 1200 MG CAPS, Take 1,200 mg by mouth in the morning, Disp: , Rfl:     omeprazole (PriLOSEC) 20 mg delayed release capsule, Take 20 mg by mouth daily, Disp: , Rfl:     pramipexole (MIRAPEX) 0.5 mg tablet, Take 1 tablet (0.5 mg total) by mouth daily at bedtime, Disp: 30 tablet, Rfl: 2    sildenafil (VIAGRA) 100 mg tablet, Take 1 tablet (100 mg total) by mouth daily as needed for erectile dysfunction, Disp: 10 tablet, Rfl: 1     vardenafil (LEVITRA) 20 MG tablet, Take 1 tablet (20 mg total) by mouth daily as needed for erectile dysfunction, Disp: 10 tablet, Rfl: 5    zinc gluconate 50 mg tablet, Take 50 mg by mouth daily, Disp: , Rfl:   No Known Allergies    Vitals:    12/18/23 0916   BP: 122/64   Pulse: 94   Resp: 18   Temp: 98.4 °F (36.9 °C)   SpO2: 96%     Wt Readings from Last 3 Encounters:   12/18/23 89.8 kg (198 lb)   11/20/23 89.8 kg (198 lb)   10/23/23 88 kg (194 lb)     Performance status: ECOG PS  Physical Exam  Vitals reviewed.   Constitutional:       General: He is not in acute distress.     Appearance: Normal appearance.   HENT:      Head: Normocephalic and atraumatic.      Mouth/Throat:      Mouth: Mucous membranes are moist.   Eyes:      Extraocular Movements: Extraocular movements intact.      Conjunctiva/sclera: Conjunctivae normal.   Cardiovascular:      Rate and Rhythm: Normal rate.   Pulmonary:      Effort: Pulmonary effort is normal. No respiratory distress.      Breath sounds: No wheezing, rhonchi or rales.   Abdominal:      General: Abdomen is flat. There is no distension.      Palpations: Abdomen is soft.   Musculoskeletal:      Cervical back: Normal range of motion.      Right lower leg: No edema.      Left lower leg: No edema.   Skin:     General: Skin is warm.      Coloration: Skin is not jaundiced.      Comments: + skin chemoport   Neurological:      General: No focal deficit present.      Mental Status: He is alert and oriented to person, place, and time. Mental status is at baseline.      Gait: Gait normal.   Psychiatric:         Thought Content: Thought content normal.       Labs:  12/5/2023: WBC: 5.41, H/H: 14.7/42.4, MCV: 90, platelets: 192.  Creatinine: 0.90.  AST/ALT: 21/18.  Alk phos: 89.  Total protein: 6.5.  Albumin: 3.9.  Calcium: 8.9    Orders Placed This Encounter   Procedures    CT chest abdomen pelvis w contrast    CEA    CBC and differential    Comprehensive metabolic panel    CEA     Return in  about 3 months (around 3/18/2024) for Office Visit, Imaging - See orders, Labs - See Treatment Plan.  1. Please request pathology for review at [facility]  2. FU in 2 months with CT CAP  3. Chemoport flush every 6 weeks  4. Check labs now  5. Refer to GI

## 2023-12-18 ENCOUNTER — OFFICE VISIT (OUTPATIENT)
Dept: INTERNAL MEDICINE CLINIC | Facility: CLINIC | Age: 53
End: 2023-12-18
Payer: COMMERCIAL

## 2023-12-18 ENCOUNTER — DOCUMENTATION (OUTPATIENT)
Dept: HEMATOLOGY ONCOLOGY | Facility: CLINIC | Age: 53
End: 2023-12-18

## 2023-12-18 ENCOUNTER — TELEPHONE (OUTPATIENT)
Dept: HEMATOLOGY ONCOLOGY | Facility: CLINIC | Age: 53
End: 2023-12-18

## 2023-12-18 ENCOUNTER — OFFICE VISIT (OUTPATIENT)
Dept: HEMATOLOGY ONCOLOGY | Facility: CLINIC | Age: 53
End: 2023-12-18
Payer: COMMERCIAL

## 2023-12-18 VITALS
BODY MASS INDEX: 27.72 KG/M2 | OXYGEN SATURATION: 96 % | HEART RATE: 94 BPM | HEIGHT: 71 IN | DIASTOLIC BLOOD PRESSURE: 64 MMHG | WEIGHT: 198 LBS | RESPIRATION RATE: 18 BRPM | SYSTOLIC BLOOD PRESSURE: 122 MMHG | TEMPERATURE: 98.4 F

## 2023-12-18 VITALS
DIASTOLIC BLOOD PRESSURE: 72 MMHG | SYSTOLIC BLOOD PRESSURE: 110 MMHG | HEIGHT: 71 IN | WEIGHT: 198 LBS | OXYGEN SATURATION: 98 % | HEART RATE: 86 BPM | BODY MASS INDEX: 27.72 KG/M2

## 2023-12-18 DIAGNOSIS — C77.2 COLON CANCER METASTASIZED TO INTRA-ABDOMINAL LYMPH NODE (HCC): ICD-10-CM

## 2023-12-18 DIAGNOSIS — Z00.00 ANNUAL PHYSICAL EXAM: ICD-10-CM

## 2023-12-18 DIAGNOSIS — F41.9 ANXIETY AND DEPRESSION: ICD-10-CM

## 2023-12-18 DIAGNOSIS — C19 ADENOCARCINOMA OF RECTOSIGMOID JUNCTION (HCC): ICD-10-CM

## 2023-12-18 DIAGNOSIS — F32.A ANXIETY AND DEPRESSION: ICD-10-CM

## 2023-12-18 DIAGNOSIS — R73.03 PREDIABETES: Primary | ICD-10-CM

## 2023-12-18 DIAGNOSIS — F90.0 ATTENTION DEFICIT HYPERACTIVITY DISORDER (ADHD), PREDOMINANTLY INATTENTIVE TYPE: ICD-10-CM

## 2023-12-18 DIAGNOSIS — C18.9 COLON CANCER METASTASIZED TO INTRA-ABDOMINAL LYMPH NODE (HCC): ICD-10-CM

## 2023-12-18 DIAGNOSIS — D75.1 SECONDARY POLYCYTHEMIA: ICD-10-CM

## 2023-12-18 PROBLEM — Z95.828 PORT-A-CATH IN PLACE: Status: ACTIVE | Noted: 2023-12-18

## 2023-12-18 PROCEDURE — 99213 OFFICE O/P EST LOW 20 MIN: CPT | Performed by: INTERNAL MEDICINE

## 2023-12-18 PROCEDURE — 99205 OFFICE O/P NEW HI 60 MIN: CPT | Performed by: INTERNAL MEDICINE

## 2023-12-18 PROCEDURE — 99396 PREV VISIT EST AGE 40-64: CPT | Performed by: INTERNAL MEDICINE

## 2023-12-18 RX ORDER — LISDEXAMFETAMINE DIMESYLATE CAPSULES 70 MG/1
70 CAPSULE ORAL EVERY MORNING
Qty: 30 CAPSULE | Refills: 0 | Status: SHIPPED | OUTPATIENT
Start: 2023-12-18

## 2023-12-18 NOTE — PROGRESS NOTES
ADULT ANNUAL PHYSICAL  [facility] INTERNAL MEDICINE    NAME: Richard Rogers  AGE: 53 y.o. SEX: male  : 1970     DATE: 2023     Assessment and Plan:     Problem List Items Addressed This Visit        Digestive    Adenocarcinoma of rectosigmoid junction (HCC)     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  CHAI.  Diagnosed 2023 at [facility] in [facility]   CEA every 3-6 mo for 2 y, then every 6 mo for a total of 5 y   • Chest/abdominal/pelvic CT every 6-12 mo (category 2B for frequency <12 mo) from date of surgery for a total of 5 y   • Colonoscopya in 1 y after surgery except if no preoperative colonoscopy due to obstructing lesion, colonoscopy in 3-6 mo If advanced adenoma, repeat in 1 y If no advanced adenoma, repeat in 3 y, then every 5 y  Above reviewed from the oncology notes awaiting CT of the abdomen pelvis and CEA level            Other    Attention deficit hyperactivity disorder (ADHD), predominantly inattentive type     Plans he is on treatment for ADHD and current medication does not know the name and the dosage of the medication advised to call us with information patient was given Vyvanse 70 mg daily awaiting old records advised to be seen by psychiatrist referral given for psychiatrist Vyvanse was refilled         Relevant Medications    lisdexamfetamine (VYVANSE) 70 MG capsule    Other Relevant Orders    Ambulatory referral to Psych Services    Prediabetes - Primary     Last A1c 6.6 on metformin  mg daily with diabetic diet will monitor closely  Lab Results   Component Value Date    HGBA1C 6.6 (H) 2023   Above reviewed continue metformin  mg daily with diabetic diet         Relevant Orders    Hemoglobin A1C    Secondary polycythemia     Resolved with the CPAP will monitor closely         Anxiety and depression     Continue Lexapro  awaiting to be seen by psychiatrist         Relevant Medications    lisdexamfetamine (VYVANSE) 70 MG capsule    Other Relevant Orders    Ambulatory referral to Psych Services   Other Visit Diagnoses     Annual physical exam              Immunizations and preventive care screenings were discussed with patient today. Appropriate education was printed on patient's after visit summary.    Discussed risks and benefits of prostate cancer screening. We discussed the controversial history of PSA screening for prostate cancer in the [place] as well as the risk of over detection and over treatment of prostate cancer by way of PSA screening.  The patient understands that PSA blood testing is an imperfect way to screen for prostate cancer and that elevated PSA levels in the blood may also be caused by infection, inflammation, prostatic trauma or manipulation, urological procedures, or by benign prostatic enlargement.    The role of the digital rectal examination in prostate cancer screening was also discussed and I discussed with him that there is large interobserver variability in the findings of digital rectal examination.    Counseling:  Alcohol/drug use: discussed moderation in alcohol intake, the recommendations for healthy alcohol use, and avoidance of illicit drug use.  Dental Health: discussed importance of regular tooth brushing, flossing, and dental visits.  Injury prevention: discussed safety/seat belts, safety helmets, smoke detectors, carbon dioxide detectors, and smoking near bedding or upholstery.  Sexual health: discussed sexually transmitted diseases, partner selection, use of condoms, avoidance of unintended pregnancy, and contraceptive alternatives.  Exercise: the importance of regular exercise/physical activity was discussed. Recommend exercise 3-5 times per week for at least 30 minutes.          Return in about 6 weeks (around 1/29/2024).     Chief Complaint:   Refill Vyvanse and physical  Chief  Complaint   Patient presents with   • Follow-up   Also refill of the Vyvanse   History of Present Illness:   Patient came in for follow-up chronic medical condition especially ADHD for which patient is on Vyvanse 70 mg daily awaiting to be seen by psychiatry this regimen helping patient to carry out day-to-day activities and function and work full-time seen by hematology oncology awaiting CEA CT of the abdomen pelvis all the records reviewed patient has a Port-A-Cath needs to be flushed arrangements made denies any chest pain palpitations and awaiting to be seen by psychiatrist  Adult Annual Physical   Patient here for a comprehensive physical exam. The patient reports see attached under visit diagnosis    Diet and Physical Activity  Diet/Nutrition: well balanced diet.   Exercise: walking.      Depression Screening  PHQ-2/9 Depression Screening    Little interest or pleasure in doing things: 0 - not at all  Feeling down, depressed, or hopeless: 2 - more than half the days  Trouble falling or staying asleep, or sleeping too much: 3 - nearly every day  Feeling tired or having little energy: 3 - nearly every day  Poor appetite or overeatin - not at all  Feeling bad about yourself - or that you are a failure or have let yourself or your family down: 3 - nearly every day  Trouble concentrating on things, such as reading the newspaper or watching television: 3 - nearly every day  Moving or speaking so slowly that other people could have noticed. Or the opposite - being so fidgety or restless that you have been moving around a lot more than usual: 3 - nearly every day  Thoughts that you would be better off dead, or of hurting yourself in some way: 0 - not at all       General Health  Sleep: sleeps poorly.   Hearing: normal - bilateral.  Vision: no vision problems.   Dental: regular dental visits.        Health  Symptoms include: none    Advanced Care Planning  Do you have an advanced directive? no  Do you have a  durable medical power of ? no     Review of Systems:     Review of Systems   Constitutional:  Positive for activity change. Negative for appetite change, chills, diaphoresis, fatigue, fever and unexpected weight change.   HENT:  Negative for congestion, dental problem, drooling, ear discharge, ear pain, facial swelling, hearing loss, mouth sores, nosebleeds, postnasal drip, rhinorrhea, sinus pressure, sneezing, sore throat, tinnitus, trouble swallowing and voice change.    Eyes:  Negative for photophobia, pain, discharge, redness, itching and visual disturbance.   Respiratory:  Negative for apnea, cough, choking, chest tightness, shortness of breath, wheezing and stridor.    Cardiovascular:  Negative for chest pain, palpitations and leg swelling.   Gastrointestinal:  Negative for abdominal distention, abdominal pain, anal bleeding, blood in stool, constipation, diarrhea, nausea, rectal pain and vomiting.   Endocrine: Negative for cold intolerance, heat intolerance, polydipsia, polyphagia and polyuria.   Genitourinary:  Negative for decreased urine volume, difficulty urinating, dysuria, enuresis, flank pain, frequency, genital sores, hematuria and urgency.   Musculoskeletal:  Negative for arthralgias, back pain, gait problem, joint swelling, myalgias, neck pain and neck stiffness.   Skin:  Negative for color change, pallor, rash and wound.   Allergic/Immunologic: Negative.  Negative for environmental allergies, food allergies and immunocompromised state.   Neurological:  Positive for numbness. Negative for dizziness, tremors, seizures, syncope, facial asymmetry, speech difficulty, weakness, light-headedness and headaches.   Psychiatric/Behavioral:  Positive for decreased concentration. Negative for agitation, behavioral problems, confusion, dysphoric mood, hallucinations, self-injury, sleep disturbance and suicidal ideas. The patient is not nervous/anxious and is not hyperactive.    All other systems reviewed  and are negative.     Past Medical History:     Past Medical History:   Diagnosis Date   • Depression    • Sleep difficulties       Past Surgical History:     History reviewed. No pertinent surgical history.   Family History:     History reviewed. No pertinent family history.   Social History:     Social History     Socioeconomic History   • Marital status: Single     Spouse name: None   • Number of children: None   • Years of education: None   • Highest education level: None   Occupational History   • None   Tobacco Use   • Smoking status: Never   • Smokeless tobacco: Never   Substance and Sexual Activity   • Alcohol use: Never   • Drug use: Never   • Sexual activity: None   Other Topics Concern   • None   Social History Narrative   • None     Social Determinants of Health     Financial Resource Strain: Not on file   Food Insecurity: Not on file   Transportation Needs: Not on file   Physical Activity: Not on file   Stress: Not on file   Social Connections: Not on file   Intimate Partner Violence: Not on file   Housing Stability: Not on file      Current Medications:     Current Outpatient Medications   Medication Sig Dispense Refill   • Ascorbic Acid (vitamin C) 1000 MG tablet Take 1,000 mg by mouth daily     • atorvastatin (LIPITOR) 40 mg tablet Take 1 tablet (40 mg total) by mouth daily 90 tablet 1   • escitalopram (LEXAPRO) 20 mg tablet Take 1 tablet (20 mg total) by mouth daily 30 tablet 0   • gabapentin (NEURONTIN) 300 mg capsule Take 1 capsule (300 mg total) by mouth daily 30 capsule 5   • lisdexamfetamine (VYVANSE) 70 MG capsule Take 1 capsule (70 mg total) by mouth every morning Max Daily Amount: 70 mg 30 capsule 0   • metFORMIN (GLUCOPHAGE-XR) 750 mg 24 hr tablet Take 1 tablet (750 mg total) by mouth daily with breakfast 90 tablet 1   • Omega-3 Fatty Acids (Fish Oil) 1200 MG CAPS Take 1,200 mg by mouth in the morning     • omeprazole (PriLOSEC) 20 mg delayed release capsule Take 20 mg by mouth daily     •  "pramipexole (MIRAPEX) 0.5 mg tablet Take 1 tablet (0.5 mg total) by mouth daily at bedtime 30 tablet 2   • sildenafil (VIAGRA) 100 mg tablet Take 1 tablet (100 mg total) by mouth daily as needed for erectile dysfunction 10 tablet 1   • vardenafil (LEVITRA) 20 MG tablet Take 1 tablet (20 mg total) by mouth daily as needed for erectile dysfunction 10 tablet 5   • zinc gluconate 50 mg tablet Take 50 mg by mouth daily       No current facility-administered medications for this visit.      Allergies:     No Known Allergies   Physical Exam:     /72 (BP Location: Left arm, Patient Position: Sitting, Cuff Size: Standard)   Pulse 86   Ht 5' 11\" (1.803 m)   Wt 89.8 kg (198 lb)   SpO2 98%   BMI 27.62 kg/m²     Physical Exam  Vitals and nursing note reviewed.   Constitutional:       General: He is not in acute distress.     Appearance: He is well-developed.   HENT:      Head: Normocephalic and atraumatic.      Right Ear: Tympanic membrane normal.      Left Ear: Tympanic membrane normal.   Eyes:      Conjunctiva/sclera: Conjunctivae normal.   Cardiovascular:      Rate and Rhythm: Normal rate and regular rhythm.      Heart sounds: Murmur heard.   Pulmonary:      Effort: Pulmonary effort is normal. No respiratory distress.      Breath sounds: Normal breath sounds.   Abdominal:      Palpations: Abdomen is soft.      Tenderness: There is no abdominal tenderness.   Musculoskeletal:         General: No swelling.      Cervical back: Neck supple.   Skin:     General: Skin is warm and dry.      Capillary Refill: Capillary refill takes less than 2 seconds.   Neurological:      General: No focal deficit present.      Mental Status: He is alert.   Psychiatric:         Mood and Affect: Mood normal.          Demario Varma MD  [facility] INTERNAL MEDICINE    "

## 2023-12-18 NOTE — TELEPHONE ENCOUNTER
Patient Call    Who are you speaking with? [facility]     If it is not the patient, are they listed on an active communication consent form? na   What is the reason for this call? Shabnam is requesting signed release document be refaxed. Signature was cut off-needsl to sent portrait.  Please refax to [phone] attn Shabnam.  Patient is under Dr. Pollack's care     Does this require a call back? Yes- if need addl info   If a call back is required, please list best call back number [phone]   If a call back is required, advise that a message will be forwarded to their care team and someone will return their call as soon as possible.   Did you relay this information to the patient? Yes

## 2023-12-18 NOTE — PROGRESS NOTES
[facility]oke with [facility], I was instructed that a signed release form needs to be sent to them so they can send the pathology form to us.     Spoke with Missy at [facility], and she is going to get the pt to sign a release form and fax it to their facility

## 2023-12-18 NOTE — ASSESSMENT & PLAN NOTE
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  CHAI.  Diagnosed January 2023 at [facility] in [facility]   CEA every 3-6 mo for 2 y, then every 6 mo for a total of 5 y    Chest/abdominal/pelvic CT every 6-12 mo (category 2B for frequency <12 mo) from date of surgery for a total of 5 y    Colonoscopya in 1 y after surgery except if no preoperative colonoscopy due to obstructing lesion, colonoscopy in 3-6 mo If advanced adenoma, repeat in 1 y If no advanced adenoma, repeat in 3 y, then every 5 y  Above reviewed from the oncology notes awaiting CT of the abdomen pelvis and CEA level

## 2023-12-18 NOTE — ASSESSMENT & PLAN NOTE
Plans he is on treatment for ADHD and current medication does not know the name and the dosage of the medication advised to call us with information patient was given Vyvanse 70 mg daily awaiting old records advised to be seen by psychiatrist referral given for psychiatrist Vyvanse was refilled

## 2023-12-18 NOTE — ASSESSMENT & PLAN NOTE
Last A1c 6.6 on metformin  mg daily with diabetic diet will monitor closely  Lab Results   Component Value Date    HGBA1C 6.6 (H) 11/17/2023   Above reviewed continue metformin  mg daily with diabetic diet

## 2023-12-18 NOTE — TELEPHONE ENCOUNTER
Lvm with time and date of port flush, [facility] Patient has my teams number to return my call and confirm appts.

## 2023-12-22 ENCOUNTER — TELEPHONE (OUTPATIENT)
Dept: INFUSION CENTER | Facility: CLINIC | Age: 53
End: 2023-12-22

## 2023-12-22 NOTE — TELEPHONE ENCOUNTER
LVM: New patient message left. Reviewed appointment date and time as well as infusion center call back number.

## 2023-12-27 ENCOUNTER — HOSPITAL ENCOUNTER (OUTPATIENT)
Dept: INFUSION CENTER | Facility: CLINIC | Age: 53
Discharge: HOME | End: 2023-12-27

## 2023-12-27 NOTE — PROGRESS NOTES
Patient arrives for port maintenance and flush. Patient does not have labs ordered today. Port assessed for signs of infection, no redness, swelling, or drainage. Patient denies complaints at this time. Intake assessment completed with patient. New learning assessment completed with patient. Port flushed, blood return noted, de-access, dressing applied. Patient declines AVS at this time. Patient's next appointment 2/7/24 1281

## 2024-01-08 ENCOUNTER — TELEPHONE (OUTPATIENT)
Dept: PSYCHIATRY | Facility: CLINIC | Age: 54
End: 2024-01-08

## 2024-01-08 NOTE — TELEPHONE ENCOUNTER
Contacted patient in regards to Routine Referral in attempts to verify patient's needs of services and add patient to proper wait list. LVM for patient to contact intake dept  in regards to referral     REFERRAL CLOSED DUE TO UNABLE TO CONTACT PATIENT

## 2024-01-10 NOTE — TELEPHONE ENCOUNTER
Returned patient's call in regards to referral. Spoke w. Patient whom stated they are interested in services. Patient made aware of the wait list and agreed to be placed on list at this time.     TALK THERAPY/ MEDICATION MANAGEMENT   Kristina/ Inna Zapien  No provider preferences   In-Person

## 2024-02-07 ENCOUNTER — HOSPITAL ENCOUNTER (OUTPATIENT)
Dept: INFUSION CENTER | Facility: CLINIC | Age: 54
Discharge: HOME | End: 2024-02-07
Payer: COMMERCIAL

## 2024-02-07 DIAGNOSIS — C19 ADENOCARCINOMA OF RECTOSIGMOID JUNCTION (HCC): ICD-10-CM

## 2024-02-07 DIAGNOSIS — C18.9 COLON CANCER METASTASIZED TO INTRA-ABDOMINAL LYMPH NODE (HCC): ICD-10-CM

## 2024-02-07 DIAGNOSIS — C77.2 COLON CANCER METASTASIZED TO INTRA-ABDOMINAL LYMPH NODE (HCC): ICD-10-CM

## 2024-02-07 DIAGNOSIS — Z95.828 PORT-A-CATH IN PLACE: Primary | ICD-10-CM

## 2024-02-07 LAB
ALBUMIN SERPL BCP-MCNC: 4.1 G/DL (ref 3.5–5)
ALP SERPL-CCNC: 106 U/L (ref 34–104)
ALT SERPL W P-5'-P-CCNC: 18 U/L (ref 7–52)
ANION GAP SERPL CALCULATED.3IONS-SCNC: 7 MMOL/L
AST SERPL W P-5'-P-CCNC: 16 U/L (ref 13–39)
BASOPHILS # BLD AUTO: 0.02 THOUSANDS/ÂΜL (ref 0–0.1)
BASOPHILS NFR BLD AUTO: 0 % (ref 0–1)
BILIRUB SERPL-MCNC: 1.06 MG/DL (ref 0.2–1)
BUN SERPL-MCNC: 16 MG/DL (ref 5–25)
CALCIUM SERPL-MCNC: 9.3 MG/DL (ref 8.4–10.2)
CEA SERPL-MCNC: 1.2 NG/ML (ref 0–3)
CHLORIDE SERPL-SCNC: 104 MMOL/L (ref 96–108)
CO2 SERPL-SCNC: 27 MMOL/L (ref 21–32)
CREAT SERPL-MCNC: 0.8 MG/DL (ref 0.6–1.3)
EOSINOPHIL # BLD AUTO: 0.1 THOUSAND/ÂΜL (ref 0–0.61)
EOSINOPHIL NFR BLD AUTO: 2 % (ref 0–6)
ERYTHROCYTE [DISTWIDTH] IN BLOOD BY AUTOMATED COUNT: 12.7 % (ref 11.6–15.1)
GFR SERPL CREATININE-BSD FRML MDRD: 103 ML/MIN/1.73SQ M
GLUCOSE SERPL-MCNC: 197 MG/DL (ref 65–140)
HCT VFR BLD AUTO: 46 % (ref 36.5–49.3)
HGB BLD-MCNC: 15.6 G/DL (ref 12–17)
IMM GRANULOCYTES # BLD AUTO: 0.02 THOUSAND/UL (ref 0–0.2)
IMM GRANULOCYTES NFR BLD AUTO: 0 % (ref 0–2)
LYMPHOCYTES # BLD AUTO: 1.27 THOUSANDS/ÂΜL (ref 0.6–4.47)
LYMPHOCYTES NFR BLD AUTO: 23 % (ref 14–44)
MCH RBC QN AUTO: 31.3 PG (ref 26.8–34.3)
MCHC RBC AUTO-ENTMCNC: 33.9 G/DL (ref 31.4–37.4)
MCV RBC AUTO: 92 FL (ref 82–98)
MONOCYTES # BLD AUTO: 0.47 THOUSAND/ÂΜL (ref 0.17–1.22)
MONOCYTES NFR BLD AUTO: 8 % (ref 4–12)
NEUTROPHILS # BLD AUTO: 3.73 THOUSANDS/ÂΜL (ref 1.85–7.62)
NEUTS SEG NFR BLD AUTO: 67 % (ref 43–75)
NRBC BLD AUTO-RTO: 0 /100 WBCS
PLATELET # BLD AUTO: 207 THOUSANDS/UL (ref 149–390)
PMV BLD AUTO: 9.3 FL (ref 8.9–12.7)
POTASSIUM SERPL-SCNC: 3.7 MMOL/L (ref 3.5–5.3)
PROT SERPL-MCNC: 6.7 G/DL (ref 6.4–8.4)
RBC # BLD AUTO: 4.98 MILLION/UL (ref 3.88–5.62)
SODIUM SERPL-SCNC: 138 MMOL/L (ref 135–147)
WBC # BLD AUTO: 5.61 THOUSAND/UL (ref 4.31–10.16)

## 2024-02-07 PROCEDURE — 82378 CARCINOEMBRYONIC ANTIGEN: CPT

## 2024-02-07 PROCEDURE — 80053 COMPREHEN METABOLIC PANEL: CPT

## 2024-02-07 PROCEDURE — 85025 COMPLETE CBC W/AUTO DIFF WBC: CPT

## 2024-02-07 NOTE — PROGRESS NOTES
Pt presents for port a cath flush. Pt offers no complaints. Port accessed, flushed, labs drawn, saline locked and de-accessed without difficulty. AVS declined, Next appointment reviewed on 3/20 at 11am.

## 2024-02-12 ENCOUNTER — OFFICE VISIT (OUTPATIENT)
Dept: INTERNAL MEDICINE CLINIC | Facility: CLINIC | Age: 54
End: 2024-02-12
Payer: COMMERCIAL

## 2024-02-12 VITALS
HEART RATE: 82 BPM | WEIGHT: 205.6 LBS | HEIGHT: 71 IN | OXYGEN SATURATION: 99 % | SYSTOLIC BLOOD PRESSURE: 130 MMHG | DIASTOLIC BLOOD PRESSURE: 78 MMHG | BODY MASS INDEX: 28.78 KG/M2

## 2024-02-12 DIAGNOSIS — F90.0 ATTENTION DEFICIT HYPERACTIVITY DISORDER (ADHD), PREDOMINANTLY INATTENTIVE TYPE: Primary | ICD-10-CM

## 2024-02-12 DIAGNOSIS — C77.2 COLON CANCER METASTASIZED TO INTRA-ABDOMINAL LYMPH NODE (HCC): ICD-10-CM

## 2024-02-12 DIAGNOSIS — C18.9 COLON CANCER METASTASIZED TO INTRA-ABDOMINAL LYMPH NODE (HCC): ICD-10-CM

## 2024-02-12 PROCEDURE — 99213 OFFICE O/P EST LOW 20 MIN: CPT | Performed by: INTERNAL MEDICINE

## 2024-02-12 RX ORDER — LISDEXAMFETAMINE DIMESYLATE CAPSULES 70 MG/1
70 CAPSULE ORAL EVERY MORNING
Qty: 30 CAPSULE | Refills: 0 | Status: SHIPPED | OUTPATIENT
Start: 2024-02-12

## 2024-02-12 NOTE — PROGRESS NOTES
Dr. Varma's Office Visit Note  24     Richard Rogers 53 y.o. male MRN: [id number]  : 1970    Assessment:     1. Attention deficit hyperactivity disorder (ADHD), predominantly inattentive type  Assessment & Plan:  Plans he is on treatment for ADHD and current medication does not know the name and the dosage of the medication advised to call us with information patient was given Vyvanse 70 mg daily awaiting old records advised to be seen by psychiatrist referral given for psychiatrist Vyvanse was refilled    Symptoms controlled agree and continue management as follows    Continue Vyvanse 70 mg daily    Orders:  -     lisdexamfetamine (VYVANSE) 70 MG capsule; Take 1 capsule (70 mg total) by mouth every morning Max Daily Amount: 70 mg    2. Colon cancer metastasized to intra-abdominal lymph node (HCC)  Assessment & Plan:  Patient seen in the office first time has [place] history of for colon cancer treated with surgery and chemo finished chemo about 4 months ago details of the surgery chemo and the staging information not available yet requested to get as soon as possible but as per patient stage III colon cancer abdominal lymph nodes involved to be evaluated by GI and oncology as patient requested still awaiting old records    I will reviewed from a previous progress note CEA level done 1.2  No evidence of any recurrence at admission    Awaiting repeat CT abdomen    Follow-up with the oncology            Discussion Summary and Plan:  Today's care plan and medications were reviewed with patient in detail and all their questions answered to their satisfaction.    Chief Complaint   Patient presents with   • Follow-up      Subjective:  Patient came in follow-up chronic medical condition listed under visit diagnosis refill for Vyvanse seen by oncologist blood work CEA level normal all other blood work reviewed awaiting CT abdomen pelvis for follow-up for the colon cancer denies  any abdominal pain chest pain difficulty breathing nausea vomiting        The following portions of the patient's history were reviewed and updated as appropriate: allergies, current medications, past family history, past medical history, past social history, past surgical history and problem list.    Review of Systems   Constitutional:  Positive for activity change. Negative for appetite change, chills, diaphoresis, fatigue, fever and unexpected weight change.   HENT:  Negative for congestion, dental problem, drooling, ear discharge, ear pain, facial swelling, hearing loss, mouth sores, nosebleeds, postnasal drip, rhinorrhea, sinus pressure, sneezing, sore throat, tinnitus, trouble swallowing and voice change.    Eyes:  Negative for photophobia, pain, discharge, redness, itching and visual disturbance.   Respiratory:  Negative for apnea, cough, choking, chest tightness, shortness of breath, wheezing and stridor.    Cardiovascular:  Negative for chest pain, palpitations and leg swelling.   Gastrointestinal:  Negative for abdominal distention, abdominal pain, anal bleeding, blood in stool, constipation, diarrhea, nausea, rectal pain and vomiting.   Endocrine: Negative for cold intolerance, heat intolerance, polydipsia, polyphagia and polyuria.   Genitourinary:  Negative for decreased urine volume, difficulty urinating, dysuria, enuresis, flank pain, frequency, genital sores, hematuria and urgency.   Musculoskeletal:  Negative for back pain, gait problem, joint swelling, myalgias, neck pain and neck stiffness.   Skin:  Negative for color change, pallor, rash and wound.   Allergic/Immunologic: Negative.  Negative for environmental allergies, food allergies and immunocompromised state.   Neurological:  Negative for dizziness, tremors, seizures, syncope, facial asymmetry, speech difficulty, weakness, light-headedness, numbness and headaches.   Psychiatric/Behavioral:  Positive for decreased concentration. Negative for  agitation, behavioral problems, confusion, dysphoric mood, hallucinations, self-injury, sleep disturbance and suicidal ideas. The patient is nervous/anxious. The patient is not hyperactive.          Historical Information   Patient Active Problem List   Diagnosis   • Colon cancer metastasized to intra-abdominal lymph node (HCC)   • Current mild episode of major depressive disorder without prior episode (HCC)   • Lumbar radiculopathy   • Attention deficit hyperactivity disorder (ADHD), predominantly inattentive type   • Neuropathy due to chemotherapeutic drug    • Prediabetes   • Other male erectile dysfunction   • Mixed hyperlipidemia   • Adenocarcinoma of rectosigmoid junction (HCC)   • Secondary polycythemia   • Anxiety and depression   • Port-A-Cath in place     Past Medical History:   Diagnosis Date   • Depression    • Sleep difficulties      History reviewed. No pertinent surgical history.  Social History     Substance and Sexual Activity   Alcohol Use Never     Social History     Substance and Sexual Activity   Drug Use Never     Social History     Tobacco Use   Smoking Status Never   Smokeless Tobacco Never     History reviewed. No pertinent family history.  Health Maintenance Due   Topic   • Hepatitis C Screening    • Pneumococcal Vaccine: Pediatrics (0 to 5 Years) and At-Risk Patients (6 to 64 Years) (1 - PCV)   • HIV Screening    • Zoster Vaccine (1 of 2)   • COVID-19 Vaccine (1)      Meds/Allergies       Current Outpatient Medications:   •  Ascorbic Acid (vitamin C) 1000 MG tablet, Take 1,000 mg by mouth daily, Disp: , Rfl:   •  atorvastatin (LIPITOR) 40 mg tablet, Take 1 tablet (40 mg total) by mouth daily, Disp: 90 tablet, Rfl: 1  •  escitalopram (LEXAPRO) 20 mg tablet, Take 1 tablet (20 mg total) by mouth daily, Disp: 30 tablet, Rfl: 0  •  gabapentin (NEURONTIN) 300 mg capsule, Take 1 capsule (300 mg total) by mouth daily, Disp: 30 capsule, Rfl: 5  •  lisdexamfetamine (VYVANSE) 70 MG capsule, Take 1  "capsule (70 mg total) by mouth every morning Max Daily Amount: 70 mg, Disp: 30 capsule, Rfl: 0  •  metFORMIN (GLUCOPHAGE-XR) 750 mg 24 hr tablet, Take 1 tablet (750 mg total) by mouth daily with breakfast, Disp: 90 tablet, Rfl: 1  •  Omega-3 Fatty Acids (Fish Oil) 1200 MG CAPS, Take 1,200 mg by mouth in the morning, Disp: , Rfl:   •  omeprazole (PriLOSEC) 20 mg delayed release capsule, Take 20 mg by mouth daily, Disp: , Rfl:   •  pramipexole (MIRAPEX) 0.5 mg tablet, Take 1 tablet (0.5 mg total) by mouth daily at bedtime, Disp: 30 tablet, Rfl: 2  •  sildenafil (VIAGRA) 100 mg tablet, Take 1 tablet (100 mg total) by mouth daily as needed for erectile dysfunction, Disp: 10 tablet, Rfl: 1  •  vardenafil (LEVITRA) 20 MG tablet, Take 1 tablet (20 mg total) by mouth daily as needed for erectile dysfunction, Disp: 10 tablet, Rfl: 5  •  zinc gluconate 50 mg tablet, Take 50 mg by mouth daily, Disp: , Rfl:       Objective:    Vitals:   /78   Pulse 82   Ht 5' 11\" (1.803 m)   Wt 93.3 kg (205 lb 9.6 oz)   SpO2 99%   BMI 28.68 kg/m²   Body mass index is 28.68 kg/m².  Vitals:    02/12/24 0937   Weight: 93.3 kg (205 lb 9.6 oz)       Physical Exam  Vitals and nursing note reviewed.   Constitutional:       General: He is not in acute distress.     Appearance: He is well-developed. He is not ill-appearing, toxic-appearing or diaphoretic.   HENT:      Head: Normocephalic and atraumatic.      Right Ear: External ear normal.      Left Ear: External ear normal.      Nose: Nose normal.      Mouth/Throat:      Pharynx: No oropharyngeal exudate.   Eyes:      General: Lids are normal. Lids are everted, no foreign bodies appreciated. No scleral icterus.        Right eye: No discharge.         Left eye: No discharge.      Conjunctiva/sclera: Conjunctivae normal.      Pupils: Pupils are equal, round, and reactive to light.   Neck:      Thyroid: No thyromegaly.      Vascular: Normal carotid pulses. No carotid bruit, hepatojugular reflux " or JVD.      Trachea: No tracheal tenderness or tracheal deviation.   Cardiovascular:      Rate and Rhythm: Normal rate and regular rhythm.      Pulses: Normal pulses.      Heart sounds: Normal heart sounds. No murmur heard.     No friction rub. No gallop.   Pulmonary:      Effort: Pulmonary effort is normal. No respiratory distress.      Breath sounds: Normal breath sounds. No stridor. No wheezing or rales.   Chest:      Chest wall: No tenderness.   Abdominal:      General: Bowel sounds are normal. There is no distension.      Palpations: Abdomen is soft. There is no mass.      Tenderness: There is no abdominal tenderness. There is no guarding or rebound.   Musculoskeletal:         General: No tenderness or deformity. Normal range of motion.      Cervical back: Normal range of motion and neck supple. No edema, erythema or rigidity. No spinous process tenderness or muscular tenderness. Normal range of motion.   Lymphadenopathy:      Head:      Right side of head: No submental, submandibular, tonsillar, preauricular or posterior auricular adenopathy.      Left side of head: No submental, submandibular, tonsillar, preauricular, posterior auricular or occipital adenopathy.      Cervical: No cervical adenopathy.      Right cervical: No superficial, deep or posterior cervical adenopathy.     Left cervical: No superficial, deep or posterior cervical adenopathy.      Upper Body:      Right upper body: No pectoral adenopathy.      Left upper body: No pectoral adenopathy.   Skin:     General: Skin is warm and dry.      Coloration: Skin is not pale.      Findings: No erythema or rash.   Neurological:      General: No focal deficit present.      Mental Status: He is alert and oriented to person, place, and time.      Cranial Nerves: No cranial nerve deficit.      Sensory: No sensory deficit.      Motor: No tremor, abnormal muscle tone or seizure activity.      Coordination: Coordination normal.      Gait: Gait normal.       Deep Tendon Reflexes: Reflexes are normal and symmetric. Reflexes normal.   Psychiatric:         Behavior: Behavior normal.         Thought Content: Thought content normal.         Judgment: Judgment normal.         Lab Review   Hospital Outpatient Visit on 02/07/2024   Component Date Value Ref Range Status   • CEA 02/07/2024 1.2  0.0 - 3.0 ng/mL Final    Normal/Non-Smoker: 0.0-3.0 ng/mL   Normal/Smoker:     0.0-5.0 ng/mL    [employer] App[employer] Access chemiluminescent immunoassay. Results cannot be interpreted for the presence or absence of malignant disease.  Confirm baseline values for patients being serially monitored.     • WBC 02/07/2024 5.61  4.31 - 10.16 Thousand/uL Final   • RBC 02/07/2024 4.98  3.88 - 5.62 Million/uL Final   • Hemoglobin 02/07/2024 15.6  12.0 - 17.0 g/dL Final   • Hematocrit 02/07/2024 46.0  36.5 - 49.3 % Final   • MCV 02/07/2024 92  82 - 98 fL Final   • MCH 02/07/2024 31.3  26.8 - 34.3 pg Final   • MCHC 02/07/2024 33.9  31.4 - 37.4 g/dL Final   • RDW 02/07/2024 12.7  11.6 - 15.1 % Final   • MPV 02/07/2024 9.3  8.9 - 12.7 fL Final   • Platelets 02/07/2024 207  149 - 390 Thousands/uL Final   • nRBC 02/07/2024 0  /100 WBCs Final   • Neutrophils Relative 02/07/2024 67  43 - 75 % Final   • Immat GRANS % 02/07/2024 0  0 - 2 % Final   • Lymphocytes Relative 02/07/2024 23  14 - 44 % Final   • Monocytes Relative 02/07/2024 8  4 - 12 % Final   • Eosinophils Relative 02/07/2024 2  0 - 6 % Final   • Basophils Relative 02/07/2024 0  0 - 1 % Final   • Neutrophils Absolute 02/07/2024 3.73  1.85 - 7.62 Thousands/µL Final   • Immature Grans Absolute 02/07/2024 0.02  0.00 - 0.20 Thousand/uL Final   • Lymphocytes Absolute 02/07/2024 1.27  0.60 - 4.47 Thousands/µL Final   • Monocytes Absolute 02/07/2024 0.47  0.17 - 1.22 Thousand/µL Final   • Eosinophils Absolute 02/07/2024 0.10  0.00 - 0.61 Thousand/µL Final   • Basophils Absolute 02/07/2024 0.02  0.00 - 0.10 Thousands/µL Final   • Sodium 02/07/2024 138  135 -  "147 mmol/L Final   • Potassium 02/07/2024 3.7  3.5 - 5.3 mmol/L Final   • Chloride 02/07/2024 104  96 - 108 mmol/L Final   • CO2 02/07/2024 27  21 - 32 mmol/L Final   • ANION GAP 02/07/2024 7  mmol/L Final   • BUN 02/07/2024 16  5 - 25 mg/dL Final   • Creatinine 02/07/2024 0.80  0.60 - 1.30 mg/dL Final    Standardized to IDMS reference method   • Glucose 02/07/2024 197 (H)  65 - 140 mg/dL Final    If the patient is fasting, the ADA then defines impaired fasting glucose as > 100 mg/dL and diabetes as > or equal to 123 mg/dL.   • Calcium 02/07/2024 9.3  8.4 - 10.2 mg/dL Final   • AST 02/07/2024 16  13 - 39 U/L Final   • ALT 02/07/2024 18  7 - 52 U/L Final    Specimen collection should occur prior to Sulfasalazine administration due to the potential for falsely depressed results.    • Alkaline Phosphatase 02/07/2024 106 (H)  34 - 104 U/L Final   • Total Protein 02/07/2024 6.7  6.4 - 8.4 g/dL Final   • Albumin 02/07/2024 4.1  3.5 - 5.0 g/dL Final   • Total Bilirubin 02/07/2024 1.06 (H)  0.20 - 1.00 mg/dL Final    Use of this assay is not recommended for patients undergoing treatment with eltrombopag due to the potential for falsely elevated results.  N-acetyl-p-benzoquinone imine (metabolite of Acetaminophen) will generate erroneously low results in samples for patients that have taken an overdose of Acetaminophen.   • eGFR 02/07/2024 103  ml/min/1.73sq m Final         Patient Instructions   Pt is symptom free for this problem.  This diagnosis or problem is stable/well controlled  Patient is advised to continue same meds as outlined in medicine list          Demario Varma MD        \"This note has been constructed using a voice recognition system.Therefore there may be syntax, spelling, and/or grammatical errors. Please call if you have any questions. \"  "

## 2024-02-12 NOTE — ASSESSMENT & PLAN NOTE
Patient seen in the office first time [place] history of for colon cancer treated with surgery and chemo finished chemo about 4 months ago details of the surgery chemo and the staging information not available yet requested to get as soon as possible but as per patient stage III colon cancer abdominal lymph nodes involved to be evaluated by GI and oncology as patient requested still awaiting old records    I will reviewed from a previous progress note CEA level done 1.2  No evidence of any recurrence at admission    Awaiting repeat CT abdomen    Follow-up with the oncology

## 2024-02-12 NOTE — ASSESSMENT & PLAN NOTE
Plans he is on treatment for ADHD and current medication does not know the name and the dosage of the medication advised to call us with information patient was given Vyvanse 70 mg daily awaiting old records advised to be seen by psychiatrist referral given for psychiatrist Vyvanse was refilled    Symptoms controlled agree and continue management as follows    Continue Vyvanse 70 mg daily

## 2024-02-14 ENCOUNTER — HOSPITAL ENCOUNTER (OUTPATIENT)
Dept: RADIOLOGY | Facility: MEDICAL CENTER | Age: 54
Discharge: HOME | End: 2024-02-14
Payer: COMMERCIAL

## 2024-02-14 DIAGNOSIS — C18.9 COLON CANCER METASTASIZED TO INTRA-ABDOMINAL LYMPH NODE (HCC): ICD-10-CM

## 2024-02-14 DIAGNOSIS — C77.2 COLON CANCER METASTASIZED TO INTRA-ABDOMINAL LYMPH NODE (HCC): ICD-10-CM

## 2024-02-14 PROCEDURE — 71260 CT THORAX DX C+: CPT

## 2024-02-14 PROCEDURE — 74177 CT ABD & PELVIS W/CONTRAST: CPT

## 2024-02-14 PROCEDURE — G1004 CDSM NDSC: HCPCS

## 2024-02-14 RX ADMIN — IOHEXOL 100 ML: 350 INJECTION, SOLUTION INTRAVENOUS at 09:16

## 2024-02-15 ENCOUNTER — TELEPHONE (OUTPATIENT)
Dept: INTERNAL MEDICINE CLINIC | Facility: CLINIC | Age: 54
End: 2024-02-15

## 2024-02-15 NOTE — TELEPHONE ENCOUNTER
ANAHI posey lisdexamfetamine 70mg capsule take one every morning. Patient ID# [id number].[facility] health [facility] BIN#[id number]

## 2024-02-16 ENCOUNTER — TELEPHONE (OUTPATIENT)
Age: 54
End: 2024-02-16

## 2024-02-16 NOTE — TELEPHONE ENCOUNTER
"[facility] called and stated that their decision is \"open ended\".    Representative will be faxing a letter to the office with their decision.    Rep name: April  Callback  #: [phone]  "

## 2024-02-16 NOTE — TELEPHONE ENCOUNTER
PA for lisdexamfetamine (VYVANSE) 70 mg capsule Approved     Date(s) approved 2- no end date given         Patient advised by [x] [employer] Message                      [] Phone call       Pharmacy advised by [x]Fax                                     []Phone call    Approval letter scanned into [employer] Yes

## 2024-02-16 NOTE — TELEPHONE ENCOUNTER
PA for lisdexamfetamine (VYVANSE) 70 mg capsule    Submitted via    []CMM-KEY   []Surescripts-Case ID #   []Faxed to plan   [x]Other website [email or web address]   Prior Auth (EOC) ID:[id number]  []Phone call Case ID #     Office notes sent, clinical questions answered. Awaiting determination    Turnaround time for your insurance to make a decision on your Prior Authorization can take 7-21 business days.

## 2024-02-29 ENCOUNTER — TELEPHONE (OUTPATIENT)
Dept: HEMATOLOGY ONCOLOGY | Facility: CLINIC | Age: 54
End: 2024-02-29

## 2024-02-29 NOTE — TELEPHONE ENCOUNTER
II am reaching out regarding your appointment with Dr. Pollack on 3/18/24.     There has been a change to the providers schedule, and we will need to reschedule your appointment. Appointment has been cancelled.     I left a voicemail explaining to patient that this appointment will need to be rescheduled due to a change in the providers schedule. Patient was advised to call [facility] [phone] to reschedule.     Letter sent

## 2024-03-24 DIAGNOSIS — F90.0 ATTENTION DEFICIT HYPERACTIVITY DISORDER (ADHD), PREDOMINANTLY INATTENTIVE TYPE: ICD-10-CM

## 2024-03-25 RX ORDER — LISDEXAMFETAMINE DIMESYLATE CAPSULES 70 MG/1
70 CAPSULE ORAL EVERY MORNING
Qty: 30 CAPSULE | Refills: 0 | Status: SHIPPED | OUTPATIENT
Start: 2024-03-25

## 2024-03-25 NOTE — TELEPHONE ENCOUNTER
Message left for the patient regarding prescription.    Patient apparently has appointment with Dr. Sanford on April 15.  Callback awaited from the patient's.  Will renew prescription for 30 days.

## 2024-04-01 ENCOUNTER — TELEPHONE (OUTPATIENT)
Age: 54
End: 2024-04-01

## 2024-04-01 NOTE — TELEPHONE ENCOUNTER
"Pt is requesting all of his scripts be rewritten to reflect a [place] doctor/doctors office. His insurance will not cover scripts written by a [place] doctor.    Pt states he he didn't know if he needed to be seen at [facility], from here on out.  Pt states that he is out of his \"sugar pill/tabs\" he did not know the dosage or the name.    Please advise, thank you  "

## 2024-04-01 NOTE — TELEPHONE ENCOUNTER
Left message for pt to 1) call back and schedule his follow up appt with Dr. Varma in the [place] office; and 2) provide name and dosage of meds he currently needs refilled.  He can also do this through [employer].

## 2024-04-10 DIAGNOSIS — G62.0 NEUROPATHY DUE TO CHEMOTHERAPEUTIC DRUG (HCC): ICD-10-CM

## 2024-04-10 DIAGNOSIS — F32.A DEPRESSION, UNSPECIFIED DEPRESSION TYPE: ICD-10-CM

## 2024-04-10 DIAGNOSIS — G25.81 RESTLESS LEG: ICD-10-CM

## 2024-04-10 DIAGNOSIS — T45.1X5A NEUROPATHY DUE TO CHEMOTHERAPEUTIC DRUG (HCC): ICD-10-CM

## 2024-04-10 RX ORDER — GABAPENTIN 300 MG/1
300 CAPSULE ORAL DAILY
Qty: 30 CAPSULE | Refills: 5 | Status: SHIPPED | OUTPATIENT
Start: 2024-04-10

## 2024-04-10 RX ORDER — ESCITALOPRAM OXALATE 20 MG/1
20 TABLET ORAL DAILY
Qty: 30 TABLET | Refills: 0 | Status: SHIPPED | OUTPATIENT
Start: 2024-04-10

## 2024-04-10 RX ORDER — PRAMIPEXOLE DIHYDROCHLORIDE 0.5 MG/1
0.5 TABLET ORAL
Qty: 30 TABLET | Refills: 5 | Status: SHIPPED | OUTPATIENT
Start: 2024-04-10

## 2024-04-15 ENCOUNTER — HOSPITAL ENCOUNTER (OUTPATIENT)
Dept: INFUSION CENTER | Facility: CLINIC | Age: 54
Discharge: HOME | End: 2024-04-15
Payer: COMMERCIAL

## 2024-04-15 DIAGNOSIS — Z95.828 PORT-A-CATH IN PLACE: Primary | ICD-10-CM

## 2024-04-15 DIAGNOSIS — C19 ADENOCARCINOMA OF RECTOSIGMOID JUNCTION (HCC): ICD-10-CM

## 2024-04-15 PROCEDURE — 96523 IRRIG DRUG DELIVERY DEVICE: CPT

## 2024-04-15 NOTE — PROGRESS NOTES
Patient arrives to infusion center for port maintenance without labs. Patient offers no complaints today, Port accessed, blood return noted, flush performed. Port de-accessed. AVS declined.     Next appointment: 6/10/24 @ 1700

## 2024-04-21 NOTE — PROGRESS NOTES
HEMATOLOGY / ONCOLOGY CLINIC FOLLOW UP NOTE    Patient Richard Rogers  MRN: [id number]  : 1970  Date of Encounter 2024      Referring Provider:  ROSSI Pollack 2023    Reason for Encounter: follow up history of rectal cancer s/p FOLFOX adjuvant x 12 completed 2023; here for surveillance       Oncology History    No history exists.           Assessment / Plan:      1.  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  CHAI.  Diagnosed 2023 at [facility] in [facility]      2.  Secondary polycythemia-resolved with CPAP    3.  Acute DVT left popliteal vein 3/30/2023- s/p Xarelto stopped 2023.     Richard Rogers is a 53-year-old male with past medical history significant for hyperlipidemia, prediabetes, secondary polycythemia which was resolved with CPAP and stage III rectosigmoid carcinoma.      He was previously following with [facility] in [facility].  He was initially experiencing altered bowel movements for about 8 months as well as some blood in the stool.  This prompted further workup.  Colonoscopy showed a rectosigmoid mass at about 18 cm and the scope could not be passed further.  Biopsy showed adenocarcinoma, MSI was negative.  CEA was 54.2.  CT of the chest/abdomen/pelvis was negative.  His lesion was determined to be above the peritoneal reflection.      He underwent robot-assisted LAR on 2023 at  [facility].  Pathology showed the tumor invaded through the muscularis propria with 5/36 lymph nodes involved as well as 3 tumor deposits.  MSI was negative.  All margins were negative.    He received adjuvant FOLFOX x 12 completed 2023.  His CT of the chest/abdomen/pelvis was on 2023 with no findings suggestive of metastatic disease.        Last colonoscopy was 2023 in [place]. Pt notes having ulcer.  Advised patient to establish care  with GI here.  Will have office staff request pathology for review at [facility].  We will check updated labs including CEA.  He will follow-up in 2 months with CT of the chest/abdomen and pelvis for surveillance.    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  He was previously following with oncology (Dr. Shaun Pruitt) at the  [facility] [place] in [facility] and last office visit was 9/1/2023.          Port    Discussed removal of Chemo-Port, however patient prefers to keep it in for now.  This has not been flushed in several months.  We discussed importance of having it flushed on a regular basis.  He will be scheduled for Chemo-Port flush.       Follow up      Patient continues to do well from oncology perspective and will continue active surveillance.    Follow up 6 months      HPI per Dr Jaki Ramos Ken is a 53-year-old male with past medical history significant for hyperlipidemia, prediabetes, secondary polycythemia which was resolved with CPAP and stage III rectosigmoid carcinoma.  He was previously following with [facility] in [facility].  He was initially experiencing altered bowel movements for about 8 months as well as some blood in the stool.  This prompted further workup.  Colonoscopy showed a rectosigmoid mass at about 18 cm and the scope could not be passed further.  Biopsy showed adenocarcinoma, MSI was negative.  CEA was 54.2.  CT of the chest/abdomen/pelvis was negative.  His lesion was determined to be above the peritoneal reflection.  He underwent robot-assisted LAR on 1/18/2023 at   [facility] [place].  Pathology showed the tumor invaded through the muscularis propria with 5/36 lymph nodes involved as well as 3 tumor deposits.  MSI was negative.  All margins were negative.  He received adjuvant FOLFOX x 12 completed 7/2023.  His CT of the chest/abdomen/pelvis was on 8/23/2023 with no findings suggestive of metastatic disease.  He was diagnosed with DVT of the left popliteal vein in March 2023 and was on Xarelto.  He was previously following with oncology (Dr. Shaun Pruitt) at the [facility] in [facility] and last office visit was 9/1/2023.  Patient now relocated to [place].  He presents for initial consultation.     Last colonoscopy was 8/2023 in [place]. Pt notes having ulcer.  He denies any abdominal pain, nausea, vomiting, diarrhea.  Denies any dark stools, blood in his stools or other complaints.  Denies any respiratory symptoms.  He has a Chemo-Port in place which has not been flushed for several months.     His family history is positive for mother having and cancer, 1 maternal aunt had lung cancer, 1 maternal aunt had colon cancer, 1 maternal aunt had pancreatic cancer and 1 had breast cancer.      We discussed surveillance per NCCN guidelines as follows:   History and physical examination every 3-6 mo for 2 y, then every 6 mo for a total of 5 y    CEA every 3-6 mo for 2 y, then every 6 mo for a total of 5 y    Chest/abdominal/pelvic CT every 6-12 mo (category 2B for frequency <12 mo) from date of surgery for a total of 5 y    Colonoscopya in 1 y after surgery except if no preoperative colonoscopy due to obstructing lesion, colonoscopy in 3-6 mo If advanced adenoma, repeat in 1 y If no advanced adenoma, repeat in 3 y, then every 5 y   PET/CT scan is not indicated    Principles of Survivorship (COL-H)          Interval History:   4/23/2024    Mr Rogers has no active complaints other than chronic neuropathy from Oxaliplatin.  He has  only been on 300 mg Neurotin at night and states not effective; however the dosing is tid and can be up to 900 mg tid.  So he will start at least 600 mg at night only as he does not want the side effect of fatigue.  Otherwise, he has had no bowel issues/blood in stool/urine.  CEA was 1.2    Labs from 2/7/2024    Na138 K 3.7 Cr 0.80 ALT/AST 18/16  TB 1.06 WBC 5.6 Hgb 15.6 MCV 92 plts 207 ANC 3730  CEA 1.2      Recent CT surveillance CAP 2/14/2024 with no evident disease       Chest:  At least 8-month stability of a tiny left lower lobe pulmonary nodule. No new pulmonary nodules.     Abdomen and pelvis:  Nothing to indicate recurrent colon cancer.    REVIEW OF SYSTEMS:  Please note that a 14-point review of systems was performed to include Constitutional, HEENT, Respiratory, CVS, GI, , Musculoskeletal, Integumentary, Neurologic, Rheumatologic, Endocrinologic, Psychiatric, Lymphatic, and Hematologic/Oncologic systems were reviewed and are negative unless otherwise stated in HPI. Positive and negative findings pertinent to this evaluation are incorporated into the history of present illness.    As above     Primary Oncologic  Diagnosis:  1.  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  CHAI.  Diagnosed January 2023 at [facility] in [facility]  2.  Secondary polycythemia-resolved with CPAP  3.  Acute DVT left popliteal vein 3/30/2023- s/p Xarelto stopped October 2023.      Previous Hematologic/ Oncologic Treatment:   1.  S/p robot-assisted LAR 1/18/2023 by Dr. Whitehead at [place]  2.  FOLFOX x 12 cycles completed 7/2023     Current Hematologic/ Oncologic Treatment:    Observation     Test Results:  Pathology:  12/21/2022: From prior notes  Pathologic stage IIIB (AJCC V8).  TNM: pT3, pN2A, cM0; CEA: 54.2 ng/mL  Resectability: Unresectable; bowel obstruction: Not present; no central tumor: R0; circumferential resection margin (EMR): Negative;  histologic grade: G2; number of lymph nodes dissected: 36, number of positive lymph nodes: 5; lymphovascular invasion: Present; perineural invasion: Not present     Radiology:  8/23/2023: CT chest/abdomen/pelvis at [facility]:  No acute findings and no evidence of metastatic disease of the chest, abdomen or pelvis.     Laboratory:  2/6/2023: CEA: 4.9  3/7/2023: CEA: 1.3  5/2/2023: CEA: 2.0  5/15/2023: CEA: 2.1  5/30/2023: CEA: 2.1  6/27/2023: CEA: 3.0  7/26/2023: CEA: 3.32  2/7/2024 CEA 1.2        ECOG PS: 0    PROBLEM LIST:  Patient Active Problem List   Diagnosis    Colon cancer metastasized to intra-abdominal lymph node (HCC)    Current mild episode of major depressive disorder without prior episode (HCC)    Lumbar radiculopathy    Attention deficit hyperactivity disorder (ADHD), predominantly inattentive type    Neuropathy due to chemotherapeutic drug (HCC)    Prediabetes    Other male erectile dysfunction    Mixed hyperlipidemia    Adenocarcinoma of rectosigmoid junction (HCC)    Secondary polycythemia    Anxiety and depression    Port-A-Cath in place       Past Medical History:   has a past medical history of Depression and Sleep difficulties.    PAST SURGICAL HISTORY:   has no past surgical history on file.    CURRENT MEDICATIONS  Current Outpatient Medications   Medication Sig Dispense Refill    Ascorbic Acid (vitamin C) 1000 MG tablet Take 1,000 mg by mouth daily      atorvastatin (LIPITOR) 40 mg tablet Take 1 tablet (40 mg total) by mouth daily 90 tablet 1    escitalopram (LEXAPRO) 20 mg tablet Take 1 tablet (20 mg total) by mouth daily 30 tablet 0    gabapentin (NEURONTIN) 300 mg capsule Take 1 capsule (300 mg total) by mouth daily 30 capsule 5    lisdexamfetamine (VYVANSE) 70 MG capsule take 1 capsule by mouth every morning 30 capsule 0    metFORMIN (GLUCOPHAGE-XR) 750 mg 24 hr tablet Take 1 tablet (750 mg total) by mouth daily with breakfast 90 tablet 1    Omega-3 Fatty Acids (Fish Oil) 1200  MG CAPS Take 1,200 mg by mouth in the morning      omeprazole (PriLOSEC) 20 mg delayed release capsule Take 20 mg by mouth daily      pramipexole (MIRAPEX) 0.5 mg tablet Take 1 tablet (0.5 mg total) by mouth daily at bedtime 30 tablet 5    sildenafil (VIAGRA) 100 mg tablet Take 1 tablet (100 mg total) by mouth daily as needed for erectile dysfunction 10 tablet 1    vardenafil (LEVITRA) 20 MG tablet Take 1 tablet (20 mg total) by mouth daily as needed for erectile dysfunction 10 tablet 5    zinc gluconate 50 mg tablet Take 50 mg by mouth daily       No current facility-administered medications for this visit.     [unfilled]    SOCIAL HISTORY:   reports that he has never smoked. He has never used smokeless tobacco. He reports that he does not drink alcohol and does not use drugs.     FAMILY HISTORY:  family history is not on file.     ALLERGIES:  has No Known Allergies.      Physical Exam:  Vital Signs:   Visit Vitals  Smoking Status Never     There is no height or weight on file to calculate BMI.  There is no height or weight on file to calculate BSA.    GEN: Alert, awake oriented x3, in no acute distress  HEENT- No pallor, icterus, cyanosis, no oral mucosal lesions,   LAD - no palpable cervical, clavicle, axillary, inguinal LAD  Heart- normal S1 S2, regular rate and rhythm, No murmur, rubs.   Lungs- clear breathing sound bilateral.   Abdomen- soft, Non tender, bowel sounds present  Extremities- No cyanosis, clubbing, edema  Neuro- No focal neurological deficit    Labs:  Lab Results   Component Value Date    WBC 5.61 02/07/2024    HGB 15.6 02/07/2024    HCT 46.0 02/07/2024    MCV 92 02/07/2024     02/07/2024     Lab Results   Component Value Date    SODIUM 138 02/07/2024    K 3.7 02/07/2024     02/07/2024    CO2 27 02/07/2024    AGAP 7 02/07/2024    BUN 16 02/07/2024    CREATININE 0.80 02/07/2024    GLUC 197 (H) 02/07/2024    GLUF 92 11/17/2023    CALCIUM 9.3 02/07/2024    AST 16 02/07/2024    ALT 18  02/07/2024    ALKPHOS 106 (H) 02/07/2024    TP 6.7 02/07/2024    TBILI 1.06 (H) 02/07/2024    EGFR 103 02/07/2024     T CHEST, ABDOMEN AND PELVIS WITH IV CONTRAST  2/14/2024     INDICATION: C18.9: Malignant neoplasm of colon, unspecified  C77.2: Secondary and unspecified malignant neoplasm of intra-abdominal lymph nodes. History of stage III rectosigmoid adenocarcinoma, status post resection in January 2023. Negative margins but 5 positive lymph nodes. He subsequently underwent adjuvant   chemotherapy. Observation. Most recent CEA is normal.     COMPARISON: CT, dated 8/23/2023. CT, dated June 12, 2023. CT, dated 12/14/2022.     TECHNIQUE: CT examination of the chest, abdomen and pelvis was performed. Multiplanar 2D reformatted images were created from the source data.     This examination, like all CT scans performed in the [facility], was performed utilizing techniques to minimize radiation dose exposure, including the use of iterative reconstruction and automated exposure control. Radiation dose length   product (DLP) for this visit: 683 mGy-cm     IV Contrast: 100 mL of iohexol (OMNIPAQUE)  Enteric Contrast: Not administered.     FINDINGS:     CHEST     LUNGS: No new concerning pulmonary nodule. 3 mm superior segment left lower lobe pulmonary nodule is stable from the index comparison study of June 2023.     Scattered areas of benign linear scarring and intrapulmonary lymph nodes extending along both major fissures (annotated on series 6).     Bibasilar atelectasis. Airways are normal.     PLEURA: Unremarkable.     HEART/GREAT VESSELS: Heart is unremarkable for patient's age. No thoracic aortic aneurysm.     MEDIASTINUM AND ESTUARDO: Unremarkable.     CHEST WALL AND LOWER NECK: Left internal jugular venous port terminates in the lower superior vena cava. No enlarged lymph nodes.     ABDOMEN     LIVER/BILIARY TREE: Unremarkable.     GALLBLADDER: Cholelithiasis without findings of acute  cholecystitis.     SPLEEN: Unremarkable.     PANCREAS: Unremarkable.     ADRENAL GLANDS: Unremarkable.     KIDNEYS/URETERS: Unremarkable. No hydronephrosis.     STOMACH AND BOWEL: Postsurgical changes related to rectosigmoid resection. Anastomosis is normal. Remainder of colon is normal. Small bowel is normal. Stomach is normal.     APPENDIX: Normal.     ABDOMINOPELVIC CAVITY: No ascites. No pneumoperitoneum. No lymphadenopathy.     VESSELS: Unremarkable for patient's age.     PELVIS     REPRODUCTIVE ORGANS: Unremarkable for patient's age.     URINARY BLADDER: Unremarkable.     ABDOMINAL WALL/INGUINAL REGIONS: Unremarkable.     BONES: No acute fracture or suspicious osseous lesion. Spinal degenerative changes. Cervical ACDF. L4-L5 instrumented posterior spinal fusion.     IMPRESSION:     Chest:  At least 8-month stability of a tiny left lower lobe pulmonary nodule. No new pulmonary nodules.     Abdomen and pelvis:  Nothing to indicate recurrent colon cancer.      I spent 40 minutes on chart review, face to face counseling time, coordination of care and documentation.    Christina Ashley MD PhD

## 2024-04-23 ENCOUNTER — OFFICE VISIT (OUTPATIENT)
Dept: HEMATOLOGY ONCOLOGY | Facility: CLINIC | Age: 54
End: 2024-04-23
Payer: COMMERCIAL

## 2024-04-23 VITALS
HEIGHT: 71 IN | BODY MASS INDEX: 27.93 KG/M2 | WEIGHT: 199.5 LBS | DIASTOLIC BLOOD PRESSURE: 76 MMHG | TEMPERATURE: 98.4 F | OXYGEN SATURATION: 97 % | SYSTOLIC BLOOD PRESSURE: 132 MMHG | RESPIRATION RATE: 17 BRPM | HEART RATE: 101 BPM

## 2024-04-23 DIAGNOSIS — C20 RECTAL CANCER (HCC): ICD-10-CM

## 2024-04-23 DIAGNOSIS — C78.6 PSEUDOMYXOMA PERITONEI (HCC): Primary | ICD-10-CM

## 2024-04-23 PROCEDURE — 99215 OFFICE O/P EST HI 40 MIN: CPT | Performed by: INTERNAL MEDICINE

## 2024-04-23 NOTE — PATIENT INSTRUCTIONS
Gabapentin 300 mg at night x 2-3 days then add another 300 mg dose     CT chest/abd/pelvis with contrast in 6 months  Labs in 6 months   Follow up 6 months

## 2024-04-28 DIAGNOSIS — F90.0 ATTENTION DEFICIT HYPERACTIVITY DISORDER (ADHD), PREDOMINANTLY INATTENTIVE TYPE: ICD-10-CM

## 2024-04-29 RX ORDER — LISDEXAMFETAMINE DIMESYLATE 70 MG/1
70 CAPSULE ORAL EVERY MORNING
Qty: 30 CAPSULE | Refills: 0 | Status: SHIPPED | OUTPATIENT
Start: 2024-04-29

## 2024-05-02 PROBLEM — Z85.048 PERSONAL HISTORY OF RECTAL CANCER: Status: ACTIVE | Noted: 2024-05-02

## 2024-05-05 DIAGNOSIS — F32.A DEPRESSION, UNSPECIFIED DEPRESSION TYPE: ICD-10-CM

## 2024-05-05 DIAGNOSIS — T45.1X5A NEUROPATHY DUE TO CHEMOTHERAPEUTIC DRUG (HCC): ICD-10-CM

## 2024-05-05 DIAGNOSIS — G62.0 NEUROPATHY DUE TO CHEMOTHERAPEUTIC DRUG (HCC): ICD-10-CM

## 2024-05-07 RX ORDER — GABAPENTIN 300 MG/1
300 CAPSULE ORAL DAILY
Qty: 30 CAPSULE | Refills: 0 | Status: SHIPPED | OUTPATIENT
Start: 2024-05-07

## 2024-05-07 RX ORDER — ESCITALOPRAM OXALATE 20 MG/1
20 TABLET ORAL DAILY
Qty: 30 TABLET | Refills: 0 | Status: SHIPPED | OUTPATIENT
Start: 2024-05-07

## 2024-05-12 ENCOUNTER — HOSPITAL ENCOUNTER (EMERGENCY)
Facility: HOSPITAL | Age: 54
Discharge: HOME | End: 2024-05-12
Attending: EMERGENCY MEDICINE
Payer: COMMERCIAL

## 2024-05-12 ENCOUNTER — OFFICE VISIT (OUTPATIENT)
Dept: URGENT CARE | Facility: CLINIC | Age: 54
End: 2024-05-12
Payer: COMMERCIAL

## 2024-05-12 VITALS
HEART RATE: 84 BPM | RESPIRATION RATE: 16 BRPM | DIASTOLIC BLOOD PRESSURE: 60 MMHG | TEMPERATURE: 97.7 F | SYSTOLIC BLOOD PRESSURE: 129 MMHG

## 2024-05-12 VITALS
HEART RATE: 88 BPM | SYSTOLIC BLOOD PRESSURE: 134 MMHG | DIASTOLIC BLOOD PRESSURE: 79 MMHG | TEMPERATURE: 97.8 F | RESPIRATION RATE: 18 BRPM | OXYGEN SATURATION: 97 %

## 2024-05-12 DIAGNOSIS — L60.0 INGROWN NAIL: Primary | ICD-10-CM

## 2024-05-12 DIAGNOSIS — L60.0 INGROWING NAIL, RIGHT GREAT TOE: Primary | ICD-10-CM

## 2024-05-12 DIAGNOSIS — L60.0 INGROWN NAIL OF GREAT TOE OF LEFT FOOT: ICD-10-CM

## 2024-05-12 PROCEDURE — 99284 EMERGENCY DEPT VISIT MOD MDM: CPT | Performed by: EMERGENCY MEDICINE

## 2024-05-12 PROCEDURE — 99283 EMERGENCY DEPT VISIT LOW MDM: CPT

## 2024-05-12 PROCEDURE — 11730 AVULSION NAIL PLATE SIMPLE 1: CPT | Performed by: EMERGENCY MEDICINE

## 2024-05-12 PROCEDURE — 99213 OFFICE O/P EST LOW 20 MIN: CPT | Performed by: NURSE PRACTITIONER

## 2024-05-12 PROCEDURE — S9088 SERVICES PROVIDED IN URGENT: HCPCS | Performed by: NURSE PRACTITIONER

## 2024-05-12 RX ORDER — GINSENG 100 MG
1 CAPSULE ORAL ONCE
Status: COMPLETED | OUTPATIENT
Start: 2024-05-12 | End: 2024-05-12

## 2024-05-12 RX ORDER — LIDOCAINE HYDROCHLORIDE 10 MG/ML
10 INJECTION, SOLUTION EPIDURAL; INFILTRATION; INTRACAUDAL; PERINEURAL ONCE
Status: COMPLETED | OUTPATIENT
Start: 2024-05-12 | End: 2024-05-12

## 2024-05-12 RX ADMIN — BACITRACIN 1 SMALL APPLICATION: 500 OINTMENT TOPICAL at 13:27

## 2024-05-12 RX ADMIN — LIDOCAINE HYDROCHLORIDE 10 ML: 10 INJECTION, SOLUTION EPIDURAL; INFILTRATION; INTRACAUDAL at 12:36

## 2024-05-12 NOTE — ED ATTENDING ATTESTATION
5/12/2024  I, Lou Doan MD, saw and evaluated the patient. I have discussed the patient with the resident/non-physician practitioner and agree with the resident's/non-physician practitioner's findings, Plan of Care, and MDM as documented in the resident's/non-physician practitioner's note, except where noted. All available labs and Radiology studies were reviewed.  I was present for key portions of any procedure(s) performed by the resident/non-physician practitioner and I was immediately available to provide assistance.       At this point I agree with the current assessment done in the Emergency Department.  I have conducted an independent evaluation of this patient a history and physical is as follows:    53-year-old presented to the ER with ingrown toenail.  No active infection.  Part of the nail was excised by resident.  Has follow-up with podiatry.    ED Course         Critical Care Time  Procedures

## 2024-05-12 NOTE — ED PROVIDER NOTES
History  Chief Complaint   Patient presents with    Nail Problem     Pt reports two ingrown toenails, right big toe possibly infected and causing pain     53-year-old male presenting due to bilateral great toe ingrown toenails.  Right toe worse than left, this has been a chronic problem for him which he has had removed previously.  Patient was seen earlier today at urgent care and given information of follow-up with podiatry but is in significant pain in his right toe.  Denies any fever, chills, or infectious symptoms.        Prior to Admission Medications   Prescriptions Last Dose Informant Patient Reported? Taking?   Ascorbic Acid (vitamin C) 1000 MG tablet   Yes No   Sig: Take 1,000 mg by mouth daily   Omega-3 Fatty Acids (Fish Oil) 1200 MG CAPS   Yes No   Sig: Take 1,200 mg by mouth in the morning   atorvastatin (LIPITOR) 40 mg tablet   No No   Sig: Take 1 tablet (40 mg total) by mouth daily   escitalopram (LEXAPRO) 20 mg tablet   No No   Sig: Take 1 tablet (20 mg total) by mouth daily   Patient not taking: Reported on 5/12/2024   gabapentin (NEURONTIN) 300 mg capsule   No No   Sig: Take 1 capsule (300 mg total) by mouth daily   lisdexamfetamine (VYVANSE) 70 MG capsule   No No   Sig: Take 1 capsule (70 mg total) by mouth every morning   metFORMIN (GLUCOPHAGE-XR) 750 mg 24 hr tablet   No No   Sig: Take 1 tablet (750 mg total) by mouth daily with breakfast   omeprazole (PriLOSEC) 20 mg delayed release capsule   Yes No   Sig: Take 20 mg by mouth daily   pramipexole (MIRAPEX) 0.5 mg tablet   No No   Sig: Take 1 tablet (0.5 mg total) by mouth daily at bedtime   sildenafil (VIAGRA) 100 mg tablet   No No   Sig: Take 1 tablet (100 mg total) by mouth daily as needed for erectile dysfunction   Patient not taking: Reported on 5/12/2024   vardenafil (LEVITRA) 20 MG tablet   No No   Sig: Take 1 tablet (20 mg total) by mouth daily as needed for erectile dysfunction   zinc gluconate 50 mg tablet   Yes No   Sig: Take 50 mg by  mouth daily      Facility-Administered Medications: None       Past Medical History:   Diagnosis Date    Depression     Sleep difficulties        History reviewed. No pertinent surgical history.    History reviewed. No pertinent family history.  I have reviewed and agree with the history as documented.    E-Cigarette/Vaping    E-Cigarette Use Never User      E-Cigarette/Vaping Substances    Nicotine No     THC No     CBD No     Flavoring No     Other No     Unknown No      Social History     Tobacco Use    Smoking status: Never    Smokeless tobacco: Never   Vaping Use    Vaping status: Never Used   Substance Use Topics    Alcohol use: Never    Drug use: Never        Review of Systems   Constitutional:  Negative for chills and fever.   HENT:  Negative for ear pain and sore throat.    Eyes:  Negative for pain and visual disturbance.   Respiratory:  Negative for cough and shortness of breath.    Cardiovascular:  Negative for chest pain and palpitations.   Gastrointestinal:  Negative for abdominal pain, nausea and vomiting.   Genitourinary:  Negative for dysuria and hematuria.   Musculoskeletal:  Negative for arthralgias and back pain.   Skin:  Negative for color change and rash.   Neurological:  Negative for dizziness, seizures, syncope and headaches.   All other systems reviewed and are negative.      Physical Exam  ED Triage Vitals [05/12/24 1146]   Temperature Pulse Respirations Blood Pressure SpO2   97.8 °F (36.6 °C) 88 18 134/79 97 %      Temp Source Heart Rate Source Patient Position - Orthostatic VS BP Location FiO2 (%)   Oral Monitor Sitting Right arm --      Pain Score       --             Orthostatic Vital Signs  Vitals:    05/12/24 1146   BP: 134/79   Pulse: 88   Patient Position - Orthostatic VS: Sitting       Physical Exam  Vitals and nursing note reviewed.   Constitutional:       General: He is not in acute distress.     Appearance: He is well-developed.   HENT:      Head: Normocephalic and atraumatic.       Nose: Nose normal. No congestion.      Mouth/Throat:      Pharynx: Oropharynx is clear.   Eyes:      Extraocular Movements: Extraocular movements intact.      Conjunctiva/sclera: Conjunctivae normal.   Cardiovascular:      Rate and Rhythm: Normal rate and regular rhythm.      Pulses: Normal pulses.      Heart sounds: Normal heart sounds. No murmur heard.  Pulmonary:      Effort: Pulmonary effort is normal. No respiratory distress.      Breath sounds: Normal breath sounds.   Chest:      Chest wall: No tenderness.   Abdominal:      General: Abdomen is flat. Bowel sounds are normal. There is no distension.      Palpations: Abdomen is soft.      Tenderness: There is no abdominal tenderness.   Musculoskeletal:         General: Swelling and tenderness present. No deformity or signs of injury. Normal range of motion.      Cervical back: Normal range of motion and neck supple.      Right lower leg: No edema.      Left lower leg: No edema.   Skin:     General: Skin is warm and dry.      Comments: Bilateral great toe ingrown toenails.  Right worse than left.  Mild redness but no discharge or fluctuance present on right toe.  Very tender to touch.   Neurological:      Mental Status: He is alert.         ED Medications  Medications   lidocaine (PF) (XYLOCAINE-MPF) 1 % injection 10 mL (10 mL Infiltration Given by Other 5/12/24 1236)   bacitracin topical ointment 1 small application (1 small application Topical Given 5/12/24 1327)       Diagnostic Studies  Results Reviewed       None                   No orders to display         Procedures  Procedures      ED Course  ED Course as of 05/12/24 1937   Sun May 12, 2024   1325 Right great toe and grown toenail removed cutting 1/2 cm portion of the lateral left great toenail out using a scalpel and scissors.  Patient was first numbed with lidocaine with a digital block and tourniquet applied to help with bleeding control.  Patient tolerated well, minimal bleeding after procedure.   Bacitracin placed and bandage applied.  Patient informed to keep area clean and dry.  Recommend follow-up with podiatry with show patient states he just made an appointment for June 12 that he will follow-up with.  Patient is agreeable and appropriate for discharge at this time.  Return precautions discussed.                                       Medical Decision Making  Risk  OTC drugs.  Prescription drug management.          Disposition  Final diagnoses:   Ingrowing nail, right great toe   Ingrown nail of great toe of left foot     Time reflects when diagnosis was documented in both MDM as applicable and the Disposition within this note       Time User Action Codes Description Comment    5/12/2024  1:19 PM Humble Celestin Add [L60.0] Ingrowing nail, right great toe     5/12/2024  1:19 PM Humble Celestin Add [L60.0] Ingrown nail of great toe of left foot           ED Disposition       ED Disposition   Discharge    Condition   Stable    Date/Time   Sun May 12, 2024  1:19 PM    Comment   Richard Rogers discharge to home/self care.                   Follow-up Information       Follow up With Specialties Details Why Contact Info Additional Information    Demario Varma MD Internal Medicine   [address] [address]  [phone]       [facility]   [address] [facility] [address]  [phone] [facility] [place] [address] [place], [address]   [phone]            Discharge Medication List as of 5/12/2024  1:20 PM        CONTINUE these medications which have NOT CHANGED    Details   Ascorbic Acid (vitamin C) 1000 MG tablet Take 1,000 mg by mouth daily, Historical Med      atorvastatin (LIPITOR) 40 mg tablet Take 1 tablet (40 mg total) by mouth daily, Starting Mon 11/20/2023, Normal      escitalopram (LEXAPRO) 20 mg tablet Take 1 tablet (20 mg total) by mouth daily, Starting Tue 5/7/2024, Normal      gabapentin  (NEURONTIN) 300 mg capsule Take 1 capsule (300 mg total) by mouth daily, Starting Tue 5/7/2024, Normal      lisdexamfetamine (VYVANSE) 70 MG capsule Take 1 capsule (70 mg total) by mouth every morning, Starting Mon 4/29/2024, Normal      metFORMIN (GLUCOPHAGE-XR) 750 mg 24 hr tablet Take 1 tablet (750 mg total) by mouth daily with breakfast, Starting Mon 11/20/2023, Until Sat 5/18/2024, Normal      Omega-3 Fatty Acids (Fish Oil) 1200 MG CAPS Take 1,200 mg by mouth in the morning, Historical Med      omeprazole (PriLOSEC) 20 mg delayed release capsule Take 20 mg by mouth daily, Historical Med      pramipexole (MIRAPEX) 0.5 mg tablet Take 1 tablet (0.5 mg total) by mouth daily at bedtime, Starting Wed 4/10/2024, Normal      sildenafil (VIAGRA) 100 mg tablet Take 1 tablet (100 mg total) by mouth daily as needed for erectile dysfunction, Starting Tue 11/7/2023, Normal      vardenafil (LEVITRA) 20 MG tablet Take 1 tablet (20 mg total) by mouth daily as needed for erectile dysfunction, Starting Mon 11/20/2023, Normal      zinc gluconate 50 mg tablet Take 50 mg by mouth daily, Historical Med           No discharge procedures on file.    PDMP Review         Value Time User    PDMP Reviewed  Yes 4/29/2024  9:44 AM Demario Varma MD             ED Provider  Attending physically available and evaluated Richard Rogers. I managed the patient along with the ED Attending.    Electronically Signed by           Humble Celestin MD  05/12/24 1937

## 2024-05-12 NOTE — PROGRESS NOTES
[facility]        NAME: Richard Rogers is a 53 y.o. male  : 1970    MRN: [id number]  DATE: May 12, 2024  TIME: 11:36 AM    Assessment and Plan   Ingrown nail [L60.0]  1. Ingrown nail  Ambulatory Referral to Podiatry        It was explained to the patient that we are unable to remove the ingrown nail in urgent care setting.  Offered to treat infection with oral antibiotics.  Will place podiatry referral.  He states that he is in significant discomfort and will proceed to the ER.      Patient Instructions       Follow up with PCP in 3-5 days.  Proceed to  ER if symptoms worsen.    Chief Complaint     Chief Complaint   Patient presents with    Ingrown Toenail     Ingrown nails on both great toes. Has tried to take care of them himself. Skin around both nails is red.          History of Present Illness       Patient is a 53-year-old male presenting with bilateral ingrown great toenails.  He believes the nail on his right foot is infected.  He states that he normally removes the ingrown portion himself but he is unable to due to discomfort.        Review of Systems   Review of Systems   Constitutional:  Negative for activity change, chills and fever.   Skin:  Positive for color change.         Current Medications       Current Outpatient Medications:     Ascorbic Acid (vitamin C) 1000 MG tablet, Take 1,000 mg by mouth daily, Disp: , Rfl:     atorvastatin (LIPITOR) 40 mg tablet, Take 1 tablet (40 mg total) by mouth daily, Disp: 90 tablet, Rfl: 1    gabapentin (NEURONTIN) 300 mg capsule, Take 1 capsule (300 mg total) by mouth daily, Disp: 30 capsule, Rfl: 0    lisdexamfetamine (VYVANSE) 70 MG capsule, Take 1 capsule (70 mg total) by mouth every morning, Disp: 30 capsule, Rfl: 0    metFORMIN (GLUCOPHAGE-XR) 750 mg 24 hr tablet, Take 1 tablet (750 mg total) by mouth daily with breakfast, Disp: 90 tablet, Rfl: 1    Omega-3 Fatty Acids (Fish Oil) 1200 MG CAPS, Take 1,200 mg by mouth in the morning, Disp: ,  Rfl:     omeprazole (PriLOSEC) 20 mg delayed release capsule, Take 20 mg by mouth daily, Disp: , Rfl:     pramipexole (MIRAPEX) 0.5 mg tablet, Take 1 tablet (0.5 mg total) by mouth daily at bedtime, Disp: 30 tablet, Rfl: 5    vardenafil (LEVITRA) 20 MG tablet, Take 1 tablet (20 mg total) by mouth daily as needed for erectile dysfunction, Disp: 10 tablet, Rfl: 5    zinc gluconate 50 mg tablet, Take 50 mg by mouth daily, Disp: , Rfl:     escitalopram (LEXAPRO) 20 mg tablet, Take 1 tablet (20 mg total) by mouth daily (Patient not taking: Reported on 5/12/2024), Disp: 30 tablet, Rfl: 0    sildenafil (VIAGRA) 100 mg tablet, Take 1 tablet (100 mg total) by mouth daily as needed for erectile dysfunction (Patient not taking: Reported on 5/12/2024), Disp: 10 tablet, Rfl: 1    Current Allergies     Allergies as of 05/12/2024    (No Known Allergies)            The following portions of the patient's history were reviewed and updated as appropriate: allergies, current medications, past family history, past medical history, past social history, past surgical history and problem list.     Past Medical History:   Diagnosis Date    Depression     Sleep difficulties        No past surgical history on file.    No family history on file.      Medications have been verified.        Objective   /60   Pulse 84   Temp 97.7 °F (36.5 °C) (Temporal)   Resp 16        Physical Exam     Physical Exam  Vitals reviewed.   Constitutional:       General: He is awake. He is not in acute distress.     Appearance: Normal appearance.   Musculoskeletal:        Feet:    Skin:     General: Skin is warm and moist.   Neurological:      General: No focal deficit present.      Mental Status: He is alert and oriented to person, place, and time.   Psychiatric:         Behavior: Behavior is cooperative.

## 2024-05-15 ENCOUNTER — OFFICE VISIT (OUTPATIENT)
Dept: INTERNAL MEDICINE CLINIC | Facility: CLINIC | Age: 54
End: 2024-05-15
Payer: COMMERCIAL

## 2024-05-15 VITALS
HEART RATE: 93 BPM | BODY MASS INDEX: 28 KG/M2 | HEIGHT: 71 IN | OXYGEN SATURATION: 97 % | DIASTOLIC BLOOD PRESSURE: 78 MMHG | SYSTOLIC BLOOD PRESSURE: 120 MMHG | WEIGHT: 200 LBS

## 2024-05-15 DIAGNOSIS — J30.1 NON-SEASONAL ALLERGIC RHINITIS DUE TO POLLEN: ICD-10-CM

## 2024-05-15 DIAGNOSIS — R73.03 PREDIABETES: ICD-10-CM

## 2024-05-15 DIAGNOSIS — C19 ADENOCARCINOMA OF RECTOSIGMOID JUNCTION (HCC): ICD-10-CM

## 2024-05-15 DIAGNOSIS — D75.1 SECONDARY POLYCYTHEMIA: ICD-10-CM

## 2024-05-15 DIAGNOSIS — F32.0 CURRENT MILD EPISODE OF MAJOR DEPRESSIVE DISORDER WITHOUT PRIOR EPISODE (HCC): ICD-10-CM

## 2024-05-15 DIAGNOSIS — T45.1X5A NEUROPATHY DUE TO CHEMOTHERAPEUTIC DRUG (HCC): Primary | ICD-10-CM

## 2024-05-15 DIAGNOSIS — G47.33 OBSTRUCTIVE SLEEP APNEA SYNDROME: ICD-10-CM

## 2024-05-15 DIAGNOSIS — G62.0 NEUROPATHY DUE TO CHEMOTHERAPEUTIC DRUG (HCC): Primary | ICD-10-CM

## 2024-05-15 PROCEDURE — 99214 OFFICE O/P EST MOD 30 MIN: CPT | Performed by: INTERNAL MEDICINE

## 2024-05-15 RX ORDER — MONTELUKAST SODIUM 10 MG/1
10 TABLET ORAL
Qty: 90 TABLET | Refills: 3 | Status: SHIPPED | OUTPATIENT
Start: 2024-05-15

## 2024-05-15 RX ORDER — PREGABALIN 75 MG/1
CAPSULE ORAL
Qty: 60 CAPSULE | Refills: 1 | Status: SHIPPED | OUTPATIENT
Start: 2024-05-15

## 2024-05-15 RX ORDER — FLUTICASONE PROPIONATE 50 MCG
SPRAY, SUSPENSION (ML) NASAL
Qty: 18.2 ML | Refills: 10 | Status: SHIPPED | OUTPATIENT
Start: 2024-05-15

## 2024-05-15 RX ORDER — DULOXETIN HYDROCHLORIDE 30 MG/1
30 CAPSULE, DELAYED RELEASE ORAL
Qty: 90 CAPSULE | Refills: 1 | Status: SHIPPED | OUTPATIENT
Start: 2024-05-15

## 2024-05-15 NOTE — ASSESSMENT & PLAN NOTE
Last A1c 6.6 will repeat A1c until then diabetic diet diabetic diet    Agree and continue metformin 750 mg daily

## 2024-05-15 NOTE — ASSESSMENT & PLAN NOTE
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  CHAI.  Diagnosed January 2023 at [facility] in [facility]  follow up history of rectal cancer s/p FOLFOX adjuvant x 12 completed 7/2023; here for surveillance      Awaiting to be seen by oncology for surveillance CAT scan and blood work for now no signs symptoms of recurrence

## 2024-05-15 NOTE — PROGRESS NOTES
Dr. Varma's Office Visit Note  05/15/24     Richard Rogers 53 y.o. male MRN: [id number]  : 1970    Assessment:     1. Neuropathy due to chemotherapeutic drug ([facility])  Assessment & Plan:  Persistent burning pain both lower extremity mainly feet with tingling numbness also both hands on gabapentin 600 mg in 24 hours seems to be not effective at all and in fact cannot tolerate higher doses so we will make a switch as follows    Take gabapentin 300 mg at bedtime for 1 month discontinue    Start Lyrica 75 mg once a day for 2 weeks and then start taking twice a day and if needed up the doses if no side effects and able to tolerate depending on the symptom relief  Orders:  -     pregabalin (LYRICA) 75 mg capsule; Take 1 pill at bedtime for 2 weeks and then take 1 pill twice a day  2. Current mild episode of major depressive disorder without prior episode ([facility])  Assessment & Plan:  Persistent symptoms see attached second depression screening on Lexapro 20 mg which seems to be helping for anxiety but still depression symptoms persist    Will add Cymbalta 30 mg daily and slowly taper off the Lexapro  Orders:  -     DULoxetine (Cymbalta) 30 mg delayed release capsule; Take 1 capsule (30 mg total) by mouth daily at bedtime  3. Secondary polycythemia  Assessment & Plan:  Possibly secondary to sleep apnea with a CPAP.  Resolved will repeat hemoglobin hematocrit  4. Non-seasonal allergic rhinitis due to pollen  Assessment & Plan:  Persistent nasal congestion and drip and dry coughing presently on Claritin generic Zyrtec not effective    Will add Flonase 1 puff each nostril twice a day    Singular 10 mg daily  Orders:  -     fluticasone (FLONASE) 50 mcg/act nasal spray; Take 1 puff each nostril twice a day  -     montelukast (SINGULAIR) 10 mg tablet; Take 1 tablet (10 mg total) by mouth daily at bedtime  5. Adenocarcinoma of rectosigmoid junction (HCC)  Assessment & Plan:  Stage III (pT3 pN2a M0 (5/36 LNs, 3 tumor deposits))  rectosigmoid adenocarcinoma.  Grade 2.  CHAI.  Diagnosed January 2023 at [facility] in [facility]  follow up history of rectal cancer s/p FOLFOX adjuvant x 12 completed 7/2023; here for surveillance      Awaiting to be seen by oncology for surveillance CAT scan and blood work for now no signs symptoms of recurrence    6. Prediabetes  Assessment & Plan:  Last A1c 6.6 will repeat A1c until then diabetic diet diabetic diet    Agree and continue metformin 750 mg daily  Orders:  -     Hemoglobin A1C; Future  7. Obstructive sleep apnea syndrome  Assessment & Plan:  For now on CPAP    Be recommended to be evaluated by sleep medicine for the management of the treatment for sleep apnea  Orders:  -     Ambulatory Referral to Sleep Medicine; Future        Discussion Summary and Plan:  Today's care plan and medications were reviewed with patient in detail and all their questions answered to their satisfaction.    Chief Complaint   Patient presents with   • Follow-up     Gabapentin not working. Would like something different or higher dose. Allergies bad. Would like something for allergies.  Depression is worse. More down days where he feels low going through divorce. Cannot see his son as much as he would like.      Subjective:  Came in complaining of persistent burning pain both lower extremity mainly feet and hands from neuropathy due to chemo patient was on gabapentin and seems to be not effective at all and cannot tolerate higher doses of gabapentin the change in treatment made for detail refer to assessment plan visit diagnoses awaiting to be seen by oncology and the surveillance blood work and a CT of the abdomen pelvis for rectal sigmoid adenocarcinoma complains of persistent allergy postnasal nasal congestion    Taking Zyrtec all the issues addressed and discussed at length for detail refer to assessment plan visit diagnosis        The following portions of the patient's history were  reviewed and updated as appropriate: allergies, current medications, past family history, past medical history, past social history, past surgical history and problem list.    Review of Systems   Constitutional:  Negative for activity change, appetite change, chills, diaphoresis, fatigue, fever and unexpected weight change.   HENT:  Negative for congestion, dental problem, drooling, ear discharge, ear pain, facial swelling, hearing loss, mouth sores, nosebleeds, postnasal drip, rhinorrhea, sinus pressure, sneezing, sore throat, tinnitus, trouble swallowing and voice change.    Eyes:  Negative for photophobia, pain, discharge, redness, itching and visual disturbance.   Respiratory:  Negative for apnea, cough, choking, chest tightness, shortness of breath, wheezing and stridor.    Cardiovascular:  Negative for chest pain, palpitations and leg swelling.   Gastrointestinal:  Negative for abdominal distention, abdominal pain, anal bleeding, blood in stool, constipation, diarrhea, nausea, rectal pain and vomiting.   Endocrine: Negative for cold intolerance, heat intolerance, polydipsia, polyphagia and polyuria.   Genitourinary:  Negative for decreased urine volume, difficulty urinating, dysuria, enuresis, flank pain, frequency, genital sores, hematuria and urgency.   Musculoskeletal:  Positive for arthralgias. Negative for back pain, gait problem, joint swelling, myalgias, neck pain and neck stiffness.   Skin:  Negative for color change, pallor, rash and wound.   Allergic/Immunologic: Negative.  Negative for environmental allergies, food allergies and immunocompromised state.   Neurological:  Negative for dizziness, tremors, seizures, syncope, facial asymmetry, speech difficulty, weakness, light-headedness, numbness and headaches.   Psychiatric/Behavioral:  Negative for agitation, behavioral problems, confusion, decreased concentration, dysphoric mood, hallucinations, self-injury, sleep disturbance and suicidal ideas. The  patient is not nervous/anxious and is not hyperactive.    All other systems reviewed and are negative.        Historical Information   Patient Active Problem List   Diagnosis   • Colon cancer metastasized to intra-abdominal lymph node (HCC)   • Current mild episode of major depressive disorder without prior episode (HCC)   • Lumbar radiculopathy   • Attention deficit hyperactivity disorder (ADHD), predominantly inattentive type   • Neuropathy due to chemotherapeutic drug (HCC)   • Prediabetes   • Other male erectile dysfunction   • Mixed hyperlipidemia   • Adenocarcinoma of rectosigmoid junction (HCC)   • Secondary polycythemia   • Anxiety and depression   • Port-A-Cath in place   • Personal history of rectal cancer   • Non-seasonal allergic rhinitis due to pollen   • Obstructive sleep apnea syndrome     Past Medical History:   Diagnosis Date   • Depression    • Sleep difficulties      History reviewed. No pertinent surgical history.  Social History     Substance and Sexual Activity   Alcohol Use Never     Social History     Substance and Sexual Activity   Drug Use Never     Social History     Tobacco Use   Smoking Status Never   Smokeless Tobacco Never     History reviewed. No pertinent family history.  Health Maintenance Due   Topic   • Hepatitis C Screening    • Pneumococcal Vaccine: Pediatrics (0 to 5 Years) and At-Risk Patients (6 to 64 Years) (1 of 2 - PCV)   • HIV Screening    • Zoster Vaccine (1 of 2)   • COVID-19 Vaccine (2 - Moderna risk series)      Meds/Allergies       Current Outpatient Medications:   •  Ascorbic Acid (vitamin C) 1000 MG tablet, Take 1,000 mg by mouth daily, Disp: , Rfl:   •  atorvastatin (LIPITOR) 40 mg tablet, Take 1 tablet (40 mg total) by mouth daily, Disp: 90 tablet, Rfl: 1  •  DULoxetine (Cymbalta) 30 mg delayed release capsule, Take 1 capsule (30 mg total) by mouth daily at bedtime, Disp: 90 capsule, Rfl: 1  •  escitalopram (LEXAPRO) 20 mg tablet, Take 1 tablet (20 mg total) by  "mouth daily, Disp: 30 tablet, Rfl: 0  •  fluticasone (FLONASE) 50 mcg/act nasal spray, Take 1 puff each nostril twice a day, Disp: 18.2 mL, Rfl: 10  •  gabapentin (NEURONTIN) 300 mg capsule, Take 1 capsule (300 mg total) by mouth daily, Disp: 30 capsule, Rfl: 0  •  lisdexamfetamine (VYVANSE) 70 MG capsule, Take 1 capsule (70 mg total) by mouth every morning, Disp: 30 capsule, Rfl: 0  •  metFORMIN (GLUCOPHAGE-XR) 750 mg 24 hr tablet, Take 1 tablet (750 mg total) by mouth daily with breakfast, Disp: 90 tablet, Rfl: 1  •  montelukast (SINGULAIR) 10 mg tablet, Take 1 tablet (10 mg total) by mouth daily at bedtime, Disp: 90 tablet, Rfl: 3  •  Omega-3 Fatty Acids (Fish Oil) 1200 MG CAPS, Take 1,200 mg by mouth in the morning, Disp: , Rfl:   •  omeprazole (PriLOSEC) 20 mg delayed release capsule, Take 20 mg by mouth daily, Disp: , Rfl:   •  pramipexole (MIRAPEX) 0.5 mg tablet, Take 1 tablet (0.5 mg total) by mouth daily at bedtime, Disp: 30 tablet, Rfl: 5  •  pregabalin (LYRICA) 75 mg capsule, Take 1 pill at bedtime for 2 weeks and then take 1 pill twice a day, Disp: 60 capsule, Rfl: 1  •  vardenafil (LEVITRA) 20 MG tablet, Take 1 tablet (20 mg total) by mouth daily as needed for erectile dysfunction, Disp: 10 tablet, Rfl: 5  •  zinc gluconate 50 mg tablet, Take 50 mg by mouth daily, Disp: , Rfl:   •  sildenafil (VIAGRA) 100 mg tablet, Take 1 tablet (100 mg total) by mouth daily as needed for erectile dysfunction (Patient not taking: Reported on 5/12/2024), Disp: 10 tablet, Rfl: 1      Objective:    Vitals:   /78   Pulse 93   Ht 5' 11\" (1.803 m)   Wt 90.7 kg (200 lb)   SpO2 97%   BMI 27.89 kg/m²   Body mass index is 27.89 kg/m².  Vitals:    05/15/24 1656   Weight: 90.7 kg (200 lb)       Physical Exam  Vitals and nursing note reviewed.   Constitutional:       General: He is not in acute distress.     Appearance: He is well-developed. He is not ill-appearing, toxic-appearing or diaphoretic.   HENT:      Head: " Normocephalic and atraumatic.      Right Ear: External ear normal.      Left Ear: External ear normal.      Nose: Nose normal.      Mouth/Throat:      Pharynx: No oropharyngeal exudate.   Eyes:      General: Lids are normal. Lids are everted, no foreign bodies appreciated. No scleral icterus.        Right eye: No discharge.         Left eye: No discharge.      Conjunctiva/sclera: Conjunctivae normal.      Pupils: Pupils are equal, round, and reactive to light.   Neck:      Thyroid: No thyromegaly.      Vascular: Normal carotid pulses. No carotid bruit, hepatojugular reflux or JVD.      Trachea: No tracheal tenderness or tracheal deviation.   Cardiovascular:      Rate and Rhythm: Normal rate and regular rhythm.      Pulses: Normal pulses.      Heart sounds: Normal heart sounds. No murmur heard.     No friction rub. No gallop.   Pulmonary:      Effort: Pulmonary effort is normal. No respiratory distress.      Breath sounds: Normal breath sounds. No stridor. No wheezing or rales.   Chest:      Chest wall: No tenderness.   Abdominal:      General: Bowel sounds are normal. There is no distension.      Palpations: Abdomen is soft. There is no mass.      Tenderness: There is no abdominal tenderness. There is no guarding or rebound.   Musculoskeletal:         General: No tenderness or deformity. Normal range of motion.      Cervical back: Normal range of motion and neck supple. No edema, erythema or rigidity. No spinous process tenderness or muscular tenderness. Normal range of motion.   Lymphadenopathy:      Head:      Right side of head: No submental, submandibular, tonsillar, preauricular or posterior auricular adenopathy.      Left side of head: No submental, submandibular, tonsillar, preauricular, posterior auricular or occipital adenopathy.      Cervical: No cervical adenopathy.      Right cervical: No superficial, deep or posterior cervical adenopathy.     Left cervical: No superficial, deep or posterior cervical  adenopathy.      Upper Body:      Right upper body: No pectoral adenopathy.      Left upper body: No pectoral adenopathy.   Skin:     General: Skin is warm and dry.      Coloration: Skin is not pale.      Findings: No erythema or rash.   Neurological:      General: No focal deficit present.      Mental Status: He is alert and oriented to person, place, and time.      Cranial Nerves: No cranial nerve deficit.      Sensory: No sensory deficit.      Motor: No tremor, abnormal muscle tone or seizure activity.      Coordination: Coordination normal.      Gait: Gait normal.      Deep Tendon Reflexes: Reflexes are normal and symmetric. Reflexes normal.   Psychiatric:         Behavior: Behavior normal.         Thought Content: Thought content normal.         Judgment: Judgment normal.         Lab Review   No visits with results within 2 Month(s) from this visit.   Latest known visit with results is:   Hospital Outpatient Visit on 02/07/2024   Component Date Value Ref Range Status   • CEA 02/07/2024 1.2  0.0 - 3.0 ng/mL Final    Normal/Non-Smoker: 0.0-3.0 ng/mL   Normal/Smoker:     0.0-5.0 ng/mL    [employer] Access chemiluminescent immunoassay. Results cannot be interpreted for the presence or absence of malignant disease.  Confirm baseline values for patients being serially monitored.     • WBC 02/07/2024 5.61  4.31 - 10.16 Thousand/uL Final   • RBC 02/07/2024 4.98  3.88 - 5.62 Million/uL Final   • Hemoglobin 02/07/2024 15.6  12.0 - 17.0 g/dL Final   • Hematocrit 02/07/2024 46.0  36.5 - 49.3 % Final   • MCV 02/07/2024 92  82 - 98 fL Final   • MCH 02/07/2024 31.3  26.8 - 34.3 pg Final   • MCHC 02/07/2024 33.9  31.4 - 37.4 g/dL Final   • RDW 02/07/2024 12.7  11.6 - 15.1 % Final   • MPV 02/07/2024 9.3  8.9 - 12.7 fL Final   • Platelets 02/07/2024 207  149 - 390 Thousands/uL Final   • nRBC 02/07/2024 0  /100 WBCs Final   • Segmented % 02/07/2024 67  43 - 75 % Final   • Immature Grans % 02/07/2024 0  0 - 2 % Final   •  Lymphocytes % 02/07/2024 23  14 - 44 % Final   • Monocytes % 02/07/2024 8  4 - 12 % Final   • Eosinophils Relative 02/07/2024 2  0 - 6 % Final   • Basophils Relative 02/07/2024 0  0 - 1 % Final   • Absolute Neutrophils 02/07/2024 3.73  1.85 - 7.62 Thousands/µL Final   • Absolute Immature Grans 02/07/2024 0.02  0.00 - 0.20 Thousand/uL Final   • Absolute Lymphocytes 02/07/2024 1.27  0.60 - 4.47 Thousands/µL Final   • Absolute Monocytes 02/07/2024 0.47  0.17 - 1.22 Thousand/µL Final   • Eosinophils Absolute 02/07/2024 0.10  0.00 - 0.61 Thousand/µL Final   • Basophils Absolute 02/07/2024 0.02  0.00 - 0.10 Thousands/µL Final   • Sodium 02/07/2024 138  135 - 147 mmol/L Final   • Potassium 02/07/2024 3.7  3.5 - 5.3 mmol/L Final   • Chloride 02/07/2024 104  96 - 108 mmol/L Final   • CO2 02/07/2024 27  21 - 32 mmol/L Final   • ANION GAP 02/07/2024 7  mmol/L Final   • BUN 02/07/2024 16  5 - 25 mg/dL Final   • Creatinine 02/07/2024 0.80  0.60 - 1.30 mg/dL Final    Standardized to IDMS reference method   • Glucose 02/07/2024 197 (H)  65 - 140 mg/dL Final    If the patient is fasting, the ADA then defines impaired fasting glucose as > 100 mg/dL and diabetes as > or equal to 123 mg/dL.   • Calcium 02/07/2024 9.3  8.4 - 10.2 mg/dL Final   • AST 02/07/2024 16  13 - 39 U/L Final   • ALT 02/07/2024 18  7 - 52 U/L Final    Specimen collection should occur prior to Sulfasalazine administration due to the potential for falsely depressed results.    • Alkaline Phosphatase 02/07/2024 106 (H)  34 - 104 U/L Final   • Total Protein 02/07/2024 6.7  6.4 - 8.4 g/dL Final   • Albumin 02/07/2024 4.1  3.5 - 5.0 g/dL Final   • Total Bilirubin 02/07/2024 1.06 (H)  0.20 - 1.00 mg/dL Final    Use of this assay is not recommended for patients undergoing treatment with eltrombopag due to the potential for falsely elevated results.  N-acetyl-p-benzoquinone imine (metabolite of Acetaminophen) will generate erroneously low results in samples for patients  that have taken an overdose of Acetaminophen.   • eGFR 02/07/2024 103  ml/min/1.73sq m Final         Patient Instructions   Allergic Rhinitis   WHAT YOU NEED TO KNOW:   Allergic rhinitis, or hay fever, is swelling of the inside of your nose. The swelling is a reaction to allergens in the air. An allergen can be anything that causes an allergic reaction. Allergies to weeds, grass, trees, or mold often cause seasonal allergic rhinitis. Indoor dust mites, cockroaches, pet dander, or mold can also cause allergic rhinitis.   DISCHARGE INSTRUCTIONS:   Call 911 for the following:   You have chest pain or shortness of breath.       Return to the emergency department if:   You have severe pain.    You cough up blood.    Contact your healthcare provider if:   You have a fever.     You have ear or sinus pain, or a headache.    Your symptoms get worse, even after treatment.     You have yellow, green, brown, or bloody mucus coming from your nose.     Your nose is bleeding or you have pain inside your nose.     You have trouble sleeping because of your symptoms.    You have questions or concerns about your condition or care.    Medicines:   Medicines  help decrease your symptoms and clear your stuffy nose.    Take your medicine as directed.  Contact your healthcare provider if you think your medicine is not helping or if you have side effects. Tell him of her if you are allergic to any medicine. Keep a list of the medicines, vitamins, and herbs you take. Include the amounts, and when and why you take them. Bring the list or the pill bottles to follow-up visits. Carry your medicine list with you in case of an emergency.    How to manage allergic rhinitis:  The best way to manage allergic rhinitis is to avoid allergens that can trigger your symptoms. Any of the following may help decrease your symptoms:  Rinse your nose and sinuses  with a salt water solution or use a salt water nasal spray. This will help thin the mucus in your  nose and rinse away pollen and dirt. It will also help reduce swelling so you can breathe normally. Ask your healthcare provider how often to rinse your nose.    Reduce exposure to dust mites.  Wash sheets and towels in hot water every week. Cover your pillows and mattresses with allergen-free covers. Limit the number of stuffed animals and soft toys your child has. Wash your child's toys in hot water regularly. Vacuum weekly and use a vacuum  with an air filter. If possible, get rid of carpets and curtains. These collect dust and dust mites.     Reduce exposure to pollen.  Keep windows and doors closed in your house and car. Stay inside when air pollution or the pollen count is high. Run your air conditioner on recycle, and change air filters often. Shower and wash your hair before bed every night to rinse away pollen.    Reduce exposure to pet dander.  If possible, do not keep cats, dogs, birds, or other pets. If you do keep pets in your home, keep them out of bedrooms and carpeted rooms. Bathe them often.     Reduce exposure to mold.  Do not spend time in basements. Choose artificial plants instead of live plants. Keep your home's humidity at less than 45%. Do not have ponds or standing water in your home or yard.     Do not smoke.  Avoid others who smoke. Ask your healthcare provider for information if you currently smoke and need help to quit.    Follow up with your healthcare provider as directed:  You may need to see an allergist often to control your symptoms. Write down your questions so you remember to ask them during your visits.  © Copyright [employer] 2022 Information is for End User's use only and may not be sold, redistributed or otherwise used for commercial purposes. All illustrations and images included in CareNotes® are the copyrighted property of [employer] or [employer]  The above information is an  only. It is not intended as medical advice for individual  "conditions or treatments. Talk to your doctor, nurse or pharmacist before following any medical regimen to see if it is safe and effective for you.       Demario Varma MD        \"This note has been constructed using a voice recognition system.Therefore there may be syntax, spelling, and/or grammatical errors. Please call if you have any questions. \"  "

## 2024-05-15 NOTE — ASSESSMENT & PLAN NOTE
Persistent symptoms see attached second depression screening on Lexapro 20 mg which seems to be helping for anxiety but still depression symptoms persist    Will add Cymbalta 30 mg daily and slowly taper off the Lexapro

## 2024-05-15 NOTE — PATIENT INSTRUCTIONS
Allergic Rhinitis   WHAT YOU NEED TO KNOW:   Allergic rhinitis, or hay fever, is swelling of the inside of your nose. The swelling is a reaction to allergens in the air. An allergen can be anything that causes an allergic reaction. Allergies to weeds, grass, trees, or mold often cause seasonal allergic rhinitis. Indoor dust mites, cockroaches, pet dander, or mold can also cause allergic rhinitis.   DISCHARGE INSTRUCTIONS:   Call 911 for the following:   You have chest pain or shortness of breath.       Return to the emergency department if:   You have severe pain.    You cough up blood.    Contact your healthcare provider if:   You have a fever.     You have ear or sinus pain, or a headache.    Your symptoms get worse, even after treatment.     You have yellow, green, brown, or bloody mucus coming from your nose.     Your nose is bleeding or you have pain inside your nose.     You have trouble sleeping because of your symptoms.    You have questions or concerns about your condition or care.    Medicines:   Medicines  help decrease your symptoms and clear your stuffy nose.    Take your medicine as directed.  Contact your healthcare provider if you think your medicine is not helping or if you have side effects. Tell him of her if you are allergic to any medicine. Keep a list of the medicines, vitamins, and herbs you take. Include the amounts, and when and why you take them. Bring the list or the pill bottles to follow-up visits. Carry your medicine list with you in case of an emergency.    How to manage allergic rhinitis:  The best way to manage allergic rhinitis is to avoid allergens that can trigger your symptoms. Any of the following may help decrease your symptoms:  Rinse your nose and sinuses  with a salt water solution or use a salt water nasal spray. This will help thin the mucus in your nose and rinse away pollen and dirt. It will also help reduce swelling so you can breathe normally. Ask your healthcare  provider how often to rinse your nose.    Reduce exposure to dust mites.  Wash sheets and towels in hot water every week. Cover your pillows and mattresses with allergen-free covers. Limit the number of stuffed animals and soft toys your child has. Wash your child's toys in hot water regularly. Vacuum weekly and use a vacuum  with an air filter. If possible, get rid of carpets and curtains. These collect dust and dust mites.     Reduce exposure to pollen.  Keep windows and doors closed in your house and car. Stay inside when air pollution or the pollen count is high. Run your air conditioner on recycle, and change air filters often. Shower and wash your hair before bed every night to rinse away pollen.    Reduce exposure to pet dander.  If possible, do not keep cats, dogs, birds, or other pets. If you do keep pets in your home, keep them out of bedrooms and carpeted rooms. Bathe them often.     Reduce exposure to mold.  Do not spend time in basements. Choose artificial plants instead of live plants. Keep your home's humidity at less than 45%. Do not have ponds or standing water in your home or yard.     Do not smoke.  Avoid others who smoke. Ask your healthcare provider for information if you currently smoke and need help to quit.    Follow up with your healthcare provider as directed:  You may need to see an allergist often to control your symptoms. Write down your questions so you remember to ask them during your visits.  © Copyright [employer] 2022 Information is for End User's use only and may not be sold, redistributed or otherwise used for commercial purposes. All illustrations and images included in CareNotes® are the copyrighted property of [employer] or [employer]  The above information is an  only. It is not intended as medical advice for individual conditions or treatments. Talk to your doctor, nurse or pharmacist before following any medical regimen to see if it is  safe and effective for you.

## 2024-05-15 NOTE — ASSESSMENT & PLAN NOTE
Persistent burning pain both lower extremity mainly feet with tingling numbness also both hands on gabapentin 600 mg in 24 hours seems to be not effective at all and in fact cannot tolerate higher doses so we will make a switch as follows    Take gabapentin 300 mg at bedtime for 1 month discontinue    Start Lyrica 75 mg once a day for 2 weeks and then start taking twice a day and if needed up the doses if no side effects and able to tolerate depending on the symptom relief

## 2024-05-15 NOTE — ASSESSMENT & PLAN NOTE
For now on CPAP    Be recommended to be evaluated by sleep medicine for the management of the treatment for sleep apnea

## 2024-05-15 NOTE — ASSESSMENT & PLAN NOTE
Persistent nasal congestion and drip and dry coughing presently on Claritin generic Zyrtec not effective    Will add Flonase 1 puff each nostril twice a day    Singular 10 mg daily

## 2024-05-21 DIAGNOSIS — E78.2 MIXED HYPERLIPIDEMIA: ICD-10-CM

## 2024-05-21 DIAGNOSIS — R73.03 PREDIABETES: ICD-10-CM

## 2024-05-22 RX ORDER — ATORVASTATIN CALCIUM 40 MG/1
40 TABLET, FILM COATED ORAL DAILY
Qty: 90 TABLET | Refills: 1 | Status: SHIPPED | OUTPATIENT
Start: 2024-05-22

## 2024-05-22 RX ORDER — METFORMIN HYDROCHLORIDE 750 MG/1
750 TABLET, EXTENDED RELEASE ORAL
Qty: 30 TABLET | Refills: 0 | Status: SHIPPED | OUTPATIENT
Start: 2024-05-22

## 2024-06-06 DIAGNOSIS — F32.A DEPRESSION, UNSPECIFIED DEPRESSION TYPE: ICD-10-CM

## 2024-06-07 RX ORDER — ESCITALOPRAM OXALATE 20 MG/1
20 TABLET ORAL DAILY
Qty: 30 TABLET | Refills: 5 | Status: SHIPPED | OUTPATIENT
Start: 2024-06-07

## 2024-06-10 ENCOUNTER — HOSPITAL ENCOUNTER (OUTPATIENT)
Dept: INFUSION CENTER | Facility: CLINIC | Age: 54
Discharge: HOME | End: 2024-06-10
Payer: COMMERCIAL

## 2024-06-10 DIAGNOSIS — C20 RECTAL CANCER (HCC): ICD-10-CM

## 2024-06-10 DIAGNOSIS — Z95.828 PORT-A-CATH IN PLACE: Primary | ICD-10-CM

## 2024-06-10 DIAGNOSIS — C78.6 PSEUDOMYXOMA PERITONEI (HCC): ICD-10-CM

## 2024-06-10 DIAGNOSIS — C19 ADENOCARCINOMA OF RECTOSIGMOID JUNCTION (HCC): ICD-10-CM

## 2024-06-10 LAB
ALBUMIN SERPL BCP-MCNC: 4 G/DL (ref 3.5–5)
ALP SERPL-CCNC: 101 U/L (ref 34–104)
ALT SERPL W P-5'-P-CCNC: 21 U/L (ref 7–52)
ANION GAP SERPL CALCULATED.3IONS-SCNC: 5 MMOL/L (ref 4–13)
AST SERPL W P-5'-P-CCNC: 19 U/L (ref 13–39)
BASOPHILS # BLD AUTO: 0.03 THOUSANDS/ÂΜL (ref 0–0.1)
BASOPHILS NFR BLD AUTO: 0 % (ref 0–1)
BILIRUB SERPL-MCNC: 0.79 MG/DL (ref 0.2–1)
BUN SERPL-MCNC: 13 MG/DL (ref 5–25)
CALCIUM SERPL-MCNC: 9.5 MG/DL (ref 8.4–10.2)
CHLORIDE SERPL-SCNC: 105 MMOL/L (ref 96–108)
CO2 SERPL-SCNC: 31 MMOL/L (ref 21–32)
CREAT SERPL-MCNC: 0.78 MG/DL (ref 0.6–1.3)
EOSINOPHIL # BLD AUTO: 0.11 THOUSAND/ÂΜL (ref 0–0.61)
EOSINOPHIL NFR BLD AUTO: 2 % (ref 0–6)
ERYTHROCYTE [DISTWIDTH] IN BLOOD BY AUTOMATED COUNT: 12 % (ref 11.6–15.1)
GFR SERPL CREATININE-BSD FRML MDRD: 103 ML/MIN/1.73SQ M
GLUCOSE SERPL-MCNC: 106 MG/DL (ref 65–140)
HCT VFR BLD AUTO: 40.6 % (ref 36.5–49.3)
HGB BLD-MCNC: 14 G/DL (ref 12–17)
IMM GRANULOCYTES # BLD AUTO: 0.01 THOUSAND/UL (ref 0–0.2)
IMM GRANULOCYTES NFR BLD AUTO: 0 % (ref 0–2)
LYMPHOCYTES # BLD AUTO: 1.77 THOUSANDS/ÂΜL (ref 0.6–4.47)
LYMPHOCYTES NFR BLD AUTO: 26 % (ref 14–44)
MAGNESIUM SERPL-MCNC: 1.8 MG/DL (ref 1.9–2.7)
MCH RBC QN AUTO: 32.3 PG (ref 26.8–34.3)
MCHC RBC AUTO-ENTMCNC: 34.5 G/DL (ref 31.4–37.4)
MCV RBC AUTO: 94 FL (ref 82–98)
MONOCYTES # BLD AUTO: 0.64 THOUSAND/ÂΜL (ref 0.17–1.22)
MONOCYTES NFR BLD AUTO: 9 % (ref 4–12)
NEUTROPHILS # BLD AUTO: 4.32 THOUSANDS/ÂΜL (ref 1.85–7.62)
NEUTS SEG NFR BLD AUTO: 63 % (ref 43–75)
NRBC BLD AUTO-RTO: 0 /100 WBCS
PLATELET # BLD AUTO: 181 THOUSANDS/UL (ref 149–390)
PMV BLD AUTO: 9.2 FL (ref 8.9–12.7)
POTASSIUM SERPL-SCNC: 3.7 MMOL/L (ref 3.5–5.3)
PROT SERPL-MCNC: 6.4 G/DL (ref 6.4–8.4)
RBC # BLD AUTO: 4.34 MILLION/UL (ref 3.88–5.62)
RETICS # AUTO: ABNORMAL 10*3/UL (ref 14356–105094)
RETICS # CALC: 2.2 % (ref 0.37–1.87)
SODIUM SERPL-SCNC: 141 MMOL/L (ref 135–147)
WBC # BLD AUTO: 6.88 THOUSAND/UL (ref 4.31–10.16)

## 2024-06-10 PROCEDURE — 80053 COMPREHEN METABOLIC PANEL: CPT

## 2024-06-10 PROCEDURE — 85045 AUTOMATED RETICULOCYTE COUNT: CPT

## 2024-06-10 PROCEDURE — 82378 CARCINOEMBRYONIC ANTIGEN: CPT

## 2024-06-10 PROCEDURE — 85025 COMPLETE CBC W/AUTO DIFF WBC: CPT

## 2024-06-10 PROCEDURE — 83735 ASSAY OF MAGNESIUM: CPT

## 2024-06-10 NOTE — PROGRESS NOTES
Pt to clinic for labs only offering no complaints. Pt port accessed, flushed, and labs drawn without complication. Pt port de-accessed. Pt aware of next appointment on 7/29/24 at 1700. AVS declined.

## 2024-06-11 LAB — CEA SERPL-MCNC: 1 NG/ML (ref 0–3)

## 2024-06-26 ENCOUNTER — OFFICE VISIT (OUTPATIENT)
Dept: PODIATRY | Facility: CLINIC | Age: 54
End: 2024-06-26
Payer: COMMERCIAL

## 2024-06-26 VITALS
HEIGHT: 71 IN | WEIGHT: 195 LBS | DIASTOLIC BLOOD PRESSURE: 72 MMHG | OXYGEN SATURATION: 98 % | BODY MASS INDEX: 27.3 KG/M2 | HEART RATE: 86 BPM | SYSTOLIC BLOOD PRESSURE: 127 MMHG

## 2024-06-26 DIAGNOSIS — M21.41 PES PLANUS OF BOTH FEET: ICD-10-CM

## 2024-06-26 DIAGNOSIS — L60.0 INGROWN NAIL: Primary | ICD-10-CM

## 2024-06-26 DIAGNOSIS — M21.42 PES PLANUS OF BOTH FEET: ICD-10-CM

## 2024-06-26 DIAGNOSIS — M79.675 GREAT TOE PAIN, LEFT: ICD-10-CM

## 2024-06-26 PROCEDURE — 11750 EXCISION NAIL&NAIL MATRIX: CPT | Performed by: PODIATRIST

## 2024-06-26 PROCEDURE — 99203 OFFICE O/P NEW LOW 30 MIN: CPT | Performed by: PODIATRIST

## 2024-06-26 NOTE — PROGRESS NOTES
Assessment/Plan:     The patient's clinical examination today significant for a chronically tender ingrown nail to the great toes bilaterally.  He has just recently had a nail procedure done in the ED secondary to a painful ingrown nail on his right great toe and that is still doing well.  His main issue today is tenderness along the medial lateral borders of the left hallux nail plate.  There are no signs of infection noted.  There is evidence of prior attempts at matricectomy's to both of his great toes.  The patient does have a pes planus foot type and is interested in custom molded foot orthoses today.    After he left hallux block, a chemical matricectomy was performed to the medial lateral nail borders of the left great toenail.  The procedure was well-tolerated.  A compressive antimicrobial dressing was applied today.  Wound care instructions were discussed with the patient.    I have referred him to [facility] physical therapy for a biomechanical evaluation and for further evaluation for custom molded foot orthoses for his pes planus foot type.    Recommend follow-up in 4 to 6 weeks.  If the hallux is healed well, we can proceed with matrixectomy on the right hallux.     Diagnoses and all orders for this visit:    Ingrown nail  -     Ambulatory Referral to Podiatry  -     Nail removal    Great toe pain, left  -     Nail removal    Pes planus of both feet  -     Ambulatory referral to Physical Therapy; Future          Subjective:     Patient ID: Richard Rogers is a 54 y.o. male.    The patient presents today for his initial consultation with [facility] podiatry with a chief complaint of chronically painful ingrown toenails to both of his great toes.  He recently had a nail procedure done in his right hallux secondary to a painful ingrown and that is doing well at this time.  His main issue today is persistent tenderness to an incurvated left great toenail.  He also notes a long history of flatfeet.  He is  interested in custom orthotics for better support.      PAST MEDICAL HISTORY:  Past Medical History:   Diagnosis Date    Depression     Sleep difficulties        PAST SURGICAL HISTORY:  History reviewed. No pertinent surgical history.     ALLERGIES:  Patient has no known allergies.    MEDICATIONS:  Current Outpatient Medications   Medication Sig Dispense Refill    Ascorbic Acid (vitamin C) 1000 MG tablet Take 1,000 mg by mouth daily      atorvastatin (LIPITOR) 40 mg tablet take 1 tablet by mouth once daily 90 tablet 1    DULoxetine (Cymbalta) 30 mg delayed release capsule Take 1 capsule (30 mg total) by mouth daily at bedtime 90 capsule 1    escitalopram (LEXAPRO) 20 mg tablet take 1 tablet by mouth daily 30 tablet 5    fluticasone (FLONASE) 50 mcg/act nasal spray Take 1 puff each nostril twice a day 18.2 mL 10    gabapentin (NEURONTIN) 300 mg capsule Take 1 capsule (300 mg total) by mouth daily 30 capsule 0    lisdexamfetamine (VYVANSE) 70 MG capsule take 1 capsule by mouth every morning 30 capsule 0    metFORMIN (GLUCOPHAGE-XR) 750 mg 24 hr tablet take 1 tablet by mouth once daily with breakfast 30 tablet 0    montelukast (SINGULAIR) 10 mg tablet Take 1 tablet (10 mg total) by mouth daily at bedtime 90 tablet 3    Omega-3 Fatty Acids (Fish Oil) 1200 MG CAPS Take 1,200 mg by mouth in the morning      omeprazole (PriLOSEC) 20 mg delayed release capsule Take 20 mg by mouth daily      pramipexole (MIRAPEX) 0.5 mg tablet Take 1 tablet (0.5 mg total) by mouth daily at bedtime 30 tablet 5    pregabalin (LYRICA) 75 mg capsule Take 1 pill at bedtime for 2 weeks and then take 1 pill twice a day 60 capsule 1    vardenafil (LEVITRA) 20 MG tablet Take 1 tablet (20 mg total) by mouth daily as needed for erectile dysfunction 10 tablet 5    zinc gluconate 50 mg tablet Take 50 mg by mouth daily      sildenafil (VIAGRA) 100 mg tablet Take 1 tablet (100 mg total) by mouth daily as needed for erectile dysfunction (Patient not taking:  Reported on 5/12/2024) 10 tablet 1     No current facility-administered medications for this visit.       SOCIAL HISTORY:  Social History     Socioeconomic History    Marital status: Single     Spouse name: None    Number of children: None    Years of education: None    Highest education level: None   Occupational History    None   Tobacco Use    Smoking status: Never    Smokeless tobacco: Never   Vaping Use    Vaping status: Never Used   Substance and Sexual Activity    Alcohol use: Never    Drug use: Never    Sexual activity: None   Other Topics Concern    None   Social History Narrative    None     Social Determinants of Health     Financial Resource Strain: Not on file   Food Insecurity: Not on file   Transportation Needs: Not on file   Physical Activity: Not on file   Stress: Not on file   Social Connections: Not on file   Intimate Partner Violence: Not on file   Housing Stability: Not on file        Review of Systems   Constitutional: Negative.    HENT: Negative.     Eyes: Negative.    Respiratory: Negative.     Cardiovascular: Negative.    Endocrine: Negative.    Musculoskeletal: Negative.    Neurological: Negative.    Hematological: Negative.    Psychiatric/Behavioral: Negative.           Objective:     Physical Exam  Vitals reviewed.   Constitutional:       Appearance: Normal appearance.   HENT:      Head: Normocephalic and atraumatic.      Nose: Nose normal.   Eyes:      Conjunctiva/sclera: Conjunctivae normal.      Pupils: Pupils are equal, round, and reactive to light.   Cardiovascular:      Pulses:           Dorsalis pedis pulses are 2+ on the right side and 2+ on the left side.        Posterior tibial pulses are 2+ on the right side and 2+ on the left side.   Pulmonary:      Effort: Pulmonary effort is normal.   Feet:      Left foot:      Toenail Condition: Left toenails are ingrown.      Comments: The patient's clinical examination today significant for a chronically tender ingrown nail to the great  "toes bilaterally.  He has just recently had a nail procedure done in the ED secondary to a painful ingrown nail on his right great toe and that is still doing well.  His main issue today is tenderness along the medial lateral borders of the left hallux nail plate.  There are no signs of infection noted.  There is evidence of prior attempts at matricectomy's to both of his great toes.  The patient does have a pes planus foot type and is interested in custom molded foot orthoses today.  Skin:     General: Skin is warm.      Capillary Refill: Capillary refill takes less than 2 seconds.   Neurological:      General: No focal deficit present.      Mental Status: He is alert and oriented to person, place, and time.   Psychiatric:         Mood and Affect: Mood normal.         Behavior: Behavior normal.         Thought Content: Thought content normal.           Nail removal    Date/Time: 6/26/2024 8:25 AM    Performed by: Dax Hayes DPM  Authorized by: Dax Hayes DPM    Patient location:  Clinic  Indications / Diagnosis:  Left ingrown nail  Universal Protocol:  Consent: Verbal consent obtained.  Risks and benefits: risks, benefits and alternatives were discussed  Consent given by: patient  Time out: Immediately prior to procedure a \"time out\" was called to verify the correct patient, procedure, equipment, support staff and site/side marked as required.  Timeout called at: 6/26/2024 8:25 AM.  Patient understanding: patient states understanding of the procedure being performed  Patient consent: the patient's understanding of the procedure matches consent given  Patient identity confirmed: verbally with patient and provided demographic data    Location:     Foot:  L big toe  Pre-procedure details:     Skin preparation:  Alcohol    Preparation: Patient was prepped and draped in the usual sterile fashion    Anesthesia (see MAR for exact dosages):     Anesthesia method:  Nerve block    Block location:  Left hallux   "  Block needle gauge:  27 G    Block anesthetic:  Bupivacaine 0.25% w/o epi    Block technique:  Left hallux block    Block injection procedure:  Anatomic landmarks identified and introduced needle    Block outcome:  Anesthesia achieved  Nail Removal:     Nail removed:  Partial    Nail side:  Medial    Nail bed sutured: no    Ingrown nail:     Wedge excision of skin: no      Nail matrix removed or ablated:  Partial  Post-procedure details:     Dressing:  4x4 sterile gauze, antibiotic ointment and gauze roll    Patient tolerance of procedure:  Tolerated well, no immediate complications

## 2024-06-27 ENCOUNTER — TELEPHONE (OUTPATIENT)
Age: 54
End: 2024-06-27

## 2024-06-27 NOTE — TELEPHONE ENCOUNTER
Caller: Richard Rogers    Doctor: Freddy    Reason for call: Richard needs to know where he can go to get his inserts made.  Can someone please reach out to him with this info?  Thank you.      Call back#: [phone]

## 2024-06-27 NOTE — TELEPHONE ENCOUNTER
Caller: Patient     Doctor/Office: Hayes     Call regarding :  Show insoles      Call was transferred to: kiesha

## 2024-07-08 DIAGNOSIS — F90.0 ATTENTION DEFICIT HYPERACTIVITY DISORDER (ADHD), PREDOMINANTLY INATTENTIVE TYPE: ICD-10-CM

## 2024-07-09 RX ORDER — LISDEXAMFETAMINE DIMESYLATE 70 MG/1
70 CAPSULE ORAL EVERY MORNING
Qty: 30 CAPSULE | Refills: 0 | Status: SHIPPED | OUTPATIENT
Start: 2024-07-09 | End: 2024-07-17 | Stop reason: SDUPTHER

## 2024-07-14 DIAGNOSIS — R73.03 PREDIABETES: ICD-10-CM

## 2024-07-14 RX ORDER — METFORMIN HYDROCHLORIDE 750 MG/1
750 TABLET, EXTENDED RELEASE ORAL
Qty: 30 TABLET | Refills: 0 | Status: SHIPPED | OUTPATIENT
Start: 2024-07-14

## 2024-07-17 ENCOUNTER — OFFICE VISIT (OUTPATIENT)
Dept: INTERNAL MEDICINE CLINIC | Facility: CLINIC | Age: 54
End: 2024-07-17
Payer: COMMERCIAL

## 2024-07-17 VITALS
OXYGEN SATURATION: 97 % | HEIGHT: 71 IN | HEART RATE: 92 BPM | BODY MASS INDEX: 27.44 KG/M2 | SYSTOLIC BLOOD PRESSURE: 124 MMHG | WEIGHT: 196 LBS | DIASTOLIC BLOOD PRESSURE: 80 MMHG

## 2024-07-17 DIAGNOSIS — L03.032 CELLULITIS OF TOE OF LEFT FOOT: Primary | ICD-10-CM

## 2024-07-17 DIAGNOSIS — R73.03 PREDIABETES: ICD-10-CM

## 2024-07-17 DIAGNOSIS — F90.0 ATTENTION DEFICIT HYPERACTIVITY DISORDER (ADHD), PREDOMINANTLY INATTENTIVE TYPE: ICD-10-CM

## 2024-07-17 PROCEDURE — 99213 OFFICE O/P EST LOW 20 MIN: CPT | Performed by: INTERNAL MEDICINE

## 2024-07-17 RX ORDER — LISDEXAMFETAMINE DIMESYLATE 70 MG/1
70 CAPSULE ORAL EVERY MORNING
Qty: 30 CAPSULE | Refills: 0 | Status: SHIPPED | OUTPATIENT
Start: 2024-07-17

## 2024-07-17 RX ORDER — CEPHALEXIN 500 MG/1
500 CAPSULE ORAL EVERY 8 HOURS SCHEDULED
Qty: 21 CAPSULE | Refills: 0 | Status: SHIPPED | OUTPATIENT
Start: 2024-07-17 | End: 2024-07-24

## 2024-07-17 NOTE — PROGRESS NOTES
Dr. Varma's Office Visit Note  24     Richard Rogers 54 y.o. male MRN: [id number]  : 1970    Assessment:     1. Cellulitis of toe of left foot  Assessment & Plan:  Seen by podiatrist about 6 weeks ago for ingrown toenail was treated excised cauterized for now developed pain swelling redness and slight purulent discharge around the nail suspected cellulitis    Will treat with Keflex 503 times a day for 7 days while awaiting to be seen by podiatrist no open areas  Orders:  -     cephalexin (KEFLEX) 500 mg capsule; Take 1 capsule (500 mg total) by mouth every 8 (eight) hours for 7 days  2. Attention deficit hyperactivity disorder (ADHD), predominantly inattentive type  Assessment & Plan:  Plans he is on treatment for ADHD and current medication does not know the name and the dosage of the medication advised to call us with information patient was given Vyvanse 70 mg daily awaiting old records advised to be seen by psychiatrist referral given for psychiatrist    Symptoms controlled agree and continue management as follows    Continue Vyvanse 70 mg daily was refilled  Orders:  -     lisdexamfetamine (VYVANSE) 70 MG capsule; Take 1 capsule (70 mg total) by mouth every morning  3. Prediabetes  Assessment & Plan:  On metformin start 50 mg daily still awaiting A1c  Orders:  -     Hemoglobin A1C; Future        Discussion Summary and Plan:  Today's care plan and medications were reviewed with patient in detail and all their questions answered to their satisfaction.    Chief Complaint   Patient presents with   • Follow-up     Had lab work done.      Subjective:  Came in follow-up chronic medical condition especially refill for Vyvanse for ADHD seen by podiatry 6 weeks ago had excision for ingrown toenail was cauterized for now complaining of pain and swelling tenderness redness for last 5 days patient evaluated treated for deferred  Details refer to assessment plan under visit diagnosis        The following portions  of the patient's history were reviewed and updated as appropriate: allergies, current medications, past family history, past medical history, past social history, past surgical history and problem list.    Review of Systems   Constitutional:  Negative for activity change, appetite change, chills, diaphoresis, fatigue, fever and unexpected weight change.   HENT:  Negative for congestion, dental problem, drooling, ear discharge, ear pain, facial swelling, hearing loss, mouth sores, nosebleeds, postnasal drip, rhinorrhea, sinus pressure, sneezing, sore throat, tinnitus, trouble swallowing and voice change.    Eyes:  Negative for photophobia, pain, discharge, redness, itching and visual disturbance.   Respiratory:  Negative for apnea, cough, choking, chest tightness, shortness of breath, wheezing and stridor.    Cardiovascular:  Negative for chest pain, palpitations and leg swelling.   Gastrointestinal:  Negative for abdominal distention, abdominal pain, anal bleeding, blood in stool, constipation, diarrhea, nausea, rectal pain and vomiting.   Endocrine: Negative for cold intolerance, heat intolerance, polydipsia, polyphagia and polyuria.   Genitourinary:  Negative for decreased urine volume, difficulty urinating, dysuria, enuresis, flank pain, frequency, genital sores, hematuria and urgency.   Musculoskeletal:  Negative for arthralgias, back pain, gait problem, joint swelling, myalgias, neck pain and neck stiffness.   Skin:  Negative for color change, pallor, rash and wound.   Allergic/Immunologic: Negative.  Negative for environmental allergies, food allergies and immunocompromised state.   Neurological:  Negative for dizziness, tremors, seizures, syncope, facial asymmetry, speech difficulty, weakness, light-headedness, numbness and headaches.   Psychiatric/Behavioral:  Positive for decreased concentration. Negative for agitation, behavioral problems, confusion, dysphoric mood, hallucinations, self-injury, sleep  disturbance and suicidal ideas. The patient is nervous/anxious. The patient is not hyperactive.          Historical Information   Patient Active Problem List   Diagnosis   • Colon cancer metastasized to intra-abdominal lymph node (HCC)   • Current mild episode of major depressive disorder without prior episode (HCC)   • Lumbar radiculopathy   • Attention deficit hyperactivity disorder (ADHD), predominantly inattentive type   • Neuropathy due to chemotherapeutic drug (HCC)   • Prediabetes   • Other male erectile dysfunction   • Mixed hyperlipidemia   • Adenocarcinoma of rectosigmoid junction (HCC)   • Secondary polycythemia   • Anxiety and depression   • Port-A-Cath in place   • Personal history of rectal cancer   • Non-seasonal allergic rhinitis due to pollen   • Obstructive sleep apnea syndrome   • Cellulitis of toe of left foot     Past Medical History:   Diagnosis Date   • Depression    • Sleep difficulties      History reviewed. No pertinent surgical history.  Social History     Substance and Sexual Activity   Alcohol Use Never     Social History     Substance and Sexual Activity   Drug Use Never     Social History     Tobacco Use   Smoking Status Never   Smokeless Tobacco Never     History reviewed. No pertinent family history.  Health Maintenance Due   Topic   • Hepatitis C Screening    • Pneumococcal Vaccine: Pediatrics (0 to 5 Years) and At-Risk Patients (6 to 64 Years) (1 of 2 - PCV)   • HIV Screening    • Zoster Vaccine (1 of 2)   • COVID-19 Vaccine (2 - Moderna risk series)   • Influenza Vaccine (1)      Meds/Allergies       Current Outpatient Medications:   •  Ascorbic Acid (vitamin C) 1000 MG tablet, Take 1,000 mg by mouth daily, Disp: , Rfl:   •  atorvastatin (LIPITOR) 40 mg tablet, take 1 tablet by mouth once daily, Disp: 90 tablet, Rfl: 1  •  cephalexin (KEFLEX) 500 mg capsule, Take 1 capsule (500 mg total) by mouth every 8 (eight) hours for 7 days, Disp: 21 capsule, Rfl: 0  •  DULoxetine (Cymbalta)  "30 mg delayed release capsule, Take 1 capsule (30 mg total) by mouth daily at bedtime, Disp: 90 capsule, Rfl: 1  •  escitalopram (LEXAPRO) 20 mg tablet, take 1 tablet by mouth daily, Disp: 30 tablet, Rfl: 5  •  fluticasone (FLONASE) 50 mcg/act nasal spray, Take 1 puff each nostril twice a day, Disp: 18.2 mL, Rfl: 10  •  gabapentin (NEURONTIN) 300 mg capsule, Take 1 capsule (300 mg total) by mouth daily, Disp: 30 capsule, Rfl: 0  •  lisdexamfetamine (VYVANSE) 70 MG capsule, Take 1 capsule (70 mg total) by mouth every morning, Disp: 30 capsule, Rfl: 0  •  metFORMIN (GLUCOPHAGE-XR) 750 mg 24 hr tablet, take 1 tablet by mouth once daily with breakfast, Disp: 30 tablet, Rfl: 0  •  montelukast (SINGULAIR) 10 mg tablet, Take 1 tablet (10 mg total) by mouth daily at bedtime, Disp: 90 tablet, Rfl: 3  •  Omega-3 Fatty Acids (Fish Oil) 1200 MG CAPS, Take 1,200 mg by mouth in the morning, Disp: , Rfl:   •  omeprazole (PriLOSEC) 20 mg delayed release capsule, Take 20 mg by mouth daily, Disp: , Rfl:   •  pramipexole (MIRAPEX) 0.5 mg tablet, Take 1 tablet (0.5 mg total) by mouth daily at bedtime, Disp: 30 tablet, Rfl: 5  •  pregabalin (LYRICA) 75 mg capsule, Take 1 pill at bedtime for 2 weeks and then take 1 pill twice a day, Disp: 60 capsule, Rfl: 1  •  vardenafil (LEVITRA) 20 MG tablet, Take 1 tablet (20 mg total) by mouth daily as needed for erectile dysfunction, Disp: 10 tablet, Rfl: 5  •  zinc gluconate 50 mg tablet, Take 50 mg by mouth daily, Disp: , Rfl:       Objective:    Vitals:   /80   Pulse 92   Ht 5' 11\" (1.803 m)   Wt 88.9 kg (196 lb)   SpO2 97%   BMI 27.34 kg/m²   Body mass index is 27.34 kg/m².  Vitals:    07/17/24 1735   Weight: 88.9 kg (196 lb)       Physical Exam  Vitals and nursing note reviewed.   Constitutional:       General: He is not in acute distress.     Appearance: He is well-developed. He is not ill-appearing, toxic-appearing or diaphoretic.   HENT:      Head: Normocephalic and atraumatic. "      Right Ear: External ear normal.      Left Ear: External ear normal.      Nose: Nose normal.      Mouth/Throat:      Pharynx: No oropharyngeal exudate.   Eyes:      General: Lids are normal. Lids are everted, no foreign bodies appreciated. No scleral icterus.        Right eye: No discharge.         Left eye: No discharge.      Conjunctiva/sclera: Conjunctivae normal.      Pupils: Pupils are equal, round, and reactive to light.   Neck:      Thyroid: No thyromegaly.      Vascular: Normal carotid pulses. No carotid bruit, hepatojugular reflux or JVD.      Trachea: No tracheal tenderness or tracheal deviation.   Cardiovascular:      Rate and Rhythm: Normal rate and regular rhythm.      Pulses: Normal pulses.      Heart sounds: Normal heart sounds. No murmur heard.     No friction rub. No gallop.   Pulmonary:      Effort: Pulmonary effort is normal. No respiratory distress.      Breath sounds: Normal breath sounds. No stridor. No wheezing or rales.   Chest:      Chest wall: No tenderness.   Abdominal:      General: Bowel sounds are normal. There is no distension.      Palpations: Abdomen is soft. There is no mass.      Tenderness: There is no abdominal tenderness. There is no guarding or rebound.   Musculoskeletal:         General: No tenderness or deformity. Normal range of motion.      Cervical back: Normal range of motion and neck supple. No edema, erythema or rigidity. No spinous process tenderness or muscular tenderness. Normal range of motion.   Lymphadenopathy:      Head:      Right side of head: No submental, submandibular, tonsillar, preauricular or posterior auricular adenopathy.      Left side of head: No submental, submandibular, tonsillar, preauricular, posterior auricular or occipital adenopathy.      Cervical: No cervical adenopathy.      Right cervical: No superficial, deep or posterior cervical adenopathy.     Left cervical: No superficial, deep or posterior cervical adenopathy.      Upper Body:       Right upper body: No pectoral adenopathy.      Left upper body: No pectoral adenopathy.   Skin:     General: Skin is warm and dry.      Coloration: Skin is not pale.      Findings: No erythema or rash.   Neurological:      General: No focal deficit present.      Mental Status: He is alert and oriented to person, place, and time.      Cranial Nerves: No cranial nerve deficit.      Sensory: No sensory deficit.      Motor: No tremor, abnormal muscle tone or seizure activity.      Coordination: Coordination normal.      Gait: Gait normal.      Deep Tendon Reflexes: Reflexes are normal and symmetric. Reflexes normal.   Psychiatric:         Behavior: Behavior normal.         Thought Content: Thought content normal.         Judgment: Judgment normal.         Lab Review   Hospital Outpatient Visit on 06/10/2024   Component Date Value Ref Range Status   • WBC 06/10/2024 6.88  4.31 - 10.16 Thousand/uL Final   • RBC 06/10/2024 4.34  3.88 - 5.62 Million/uL Final   • Hemoglobin 06/10/2024 14.0  12.0 - 17.0 g/dL Final   • Hematocrit 06/10/2024 40.6  36.5 - 49.3 % Final   • MCV 06/10/2024 94  82 - 98 fL Final   • MCH 06/10/2024 32.3  26.8 - 34.3 pg Final   • MCHC 06/10/2024 34.5  31.4 - 37.4 g/dL Final   • RDW 06/10/2024 12.0  11.6 - 15.1 % Final   • MPV 06/10/2024 9.2  8.9 - 12.7 fL Final   • Platelets 06/10/2024 181  149 - 390 Thousands/uL Final   • nRBC 06/10/2024 0  /100 WBCs Final   • Segmented % 06/10/2024 63  43 - 75 % Final   • Immature Grans % 06/10/2024 0  0 - 2 % Final   • Lymphocytes % 06/10/2024 26  14 - 44 % Final   • Monocytes % 06/10/2024 9  4 - 12 % Final   • Eosinophils Relative 06/10/2024 2  0 - 6 % Final   • Basophils Relative 06/10/2024 0  0 - 1 % Final   • Absolute Neutrophils 06/10/2024 4.32  1.85 - 7.62 Thousands/µL Final   • Absolute Immature Grans 06/10/2024 0.01  0.00 - 0.20 Thousand/uL Final   • Absolute Lymphocytes 06/10/2024 1.77  0.60 - 4.47 Thousands/µL Final   • Absolute Monocytes 06/10/2024 0.64   0.17 - 1.22 Thousand/µL Final   • Eosinophils Absolute 06/10/2024 0.11  0.00 - 0.61 Thousand/µL Final   • Basophils Absolute 06/10/2024 0.03  0.00 - 0.10 Thousands/µL Final   • Magnesium 06/10/2024 1.8 (L)  1.9 - 2.7 mg/dL Final   • Retic Ct Abs 06/10/2024 95,500  14,356 - 105,094 Final   • Retic Ct Pct 06/10/2024 2.20 (H)  0.37 - 1.87 % Final   • Sodium 06/10/2024 141  135 - 147 mmol/L Final   • Potassium 06/10/2024 3.7  3.5 - 5.3 mmol/L Final   • Chloride 06/10/2024 105  96 - 108 mmol/L Final   • CO2 06/10/2024 31  21 - 32 mmol/L Final   • ANION GAP 06/10/2024 5  4 - 13 mmol/L Final   • BUN 06/10/2024 13  5 - 25 mg/dL Final   • Creatinine 06/10/2024 0.78  0.60 - 1.30 mg/dL Final    Standardized to IDMS reference method   • Glucose 06/10/2024 106  65 - 140 mg/dL Final    If the patient is fasting, the ADA then defines impaired fasting glucose as > 100 mg/dL and diabetes as > or equal to 123 mg/dL.   • Calcium 06/10/2024 9.5  8.4 - 10.2 mg/dL Final   • AST 06/10/2024 19  13 - 39 U/L Final   • ALT 06/10/2024 21  7 - 52 U/L Final    Specimen collection should occur prior to Sulfasalazine administration due to the potential for falsely depressed results.    • Alkaline Phosphatase 06/10/2024 101  34 - 104 U/L Final   • Total Protein 06/10/2024 6.4  6.4 - 8.4 g/dL Final   • Albumin 06/10/2024 4.0  3.5 - 5.0 g/dL Final   • Total Bilirubin 06/10/2024 0.79  0.20 - 1.00 mg/dL Final    Use of this assay is not recommended for patients undergoing treatment with eltrombopag due to the potential for falsely elevated results.  N-acetyl-p-benzoquinone imine (metabolite of Acetaminophen) will generate erroneously low results in samples for patients that have taken an overdose of Acetaminophen.   • eGFR 06/10/2024 103  ml/min/1.73sq m Final   • CEA 06/10/2024 1.0  0.0 - 3.0 ng/mL Final    Normal/Non-Smoker: 0.0-3.0 ng/mL   Normal/Smoker:     0.0-5.0 ng/mL    [employer] Access chemiluminescent immunoassay. Results cannot be  "interpreted for the presence or absence of malignant disease.  Confirm baseline values for patients being serially monitored.           Patient Instructions   Pt is symptom free for this problem.  This diagnosis or problem is stable/well controlled  Patient is advised to continue same meds as outlined in medicine list       Demario Varma MD        \"This note has been constructed using a voice recognition system.Therefore there may be syntax, spelling, and/or grammatical errors. Please call if you have any questions. \"  "

## 2024-07-17 NOTE — ASSESSMENT & PLAN NOTE
Seen by podiatrist about 6 weeks ago for ingrown toenail was treated excised cauterized for now developed pain swelling redness and slight purulent discharge around the nail suspected cellulitis    Will treat with Keflex 503 times a day for 7 days while awaiting to be seen by podiatrist no open areas

## 2024-07-17 NOTE — ASSESSMENT & PLAN NOTE
Plans he is on treatment for ADHD and current medication does not know the name and the dosage of the medication advised to call us with information patient was given Vyvanse 70 mg daily awaiting old records advised to be seen by psychiatrist referral given for psychiatrist    Symptoms controlled agree and continue management as follows    Continue Vyvanse 70 mg daily was refilled

## 2024-07-17 NOTE — PATIENT INSTRUCTIONS
Pt is symptom free for this problem.  This diagnosis or problem is stable/well controlled  Patient is advised to continue same meds as outlined in medicine list

## 2024-07-22 ENCOUNTER — OFFICE VISIT (OUTPATIENT)
Dept: SLEEP CENTER | Facility: CLINIC | Age: 54
End: 2024-07-22
Payer: COMMERCIAL

## 2024-07-22 VITALS
WEIGHT: 199 LBS | HEIGHT: 71 IN | SYSTOLIC BLOOD PRESSURE: 122 MMHG | RESPIRATION RATE: 18 BRPM | HEART RATE: 94 BPM | DIASTOLIC BLOOD PRESSURE: 68 MMHG | OXYGEN SATURATION: 99 % | BODY MASS INDEX: 27.86 KG/M2

## 2024-07-22 DIAGNOSIS — G25.81 RESTLESS LEG SYNDROME: ICD-10-CM

## 2024-07-22 DIAGNOSIS — T45.1X5A NEUROPATHY DUE TO CHEMOTHERAPEUTIC DRUG (HCC): ICD-10-CM

## 2024-07-22 DIAGNOSIS — G62.0 NEUROPATHY DUE TO CHEMOTHERAPEUTIC DRUG (HCC): ICD-10-CM

## 2024-07-22 DIAGNOSIS — G47.33 OBSTRUCTIVE SLEEP APNEA SYNDROME: Primary | ICD-10-CM

## 2024-07-22 PROCEDURE — 99205 OFFICE O/P NEW HI 60 MIN: CPT | Performed by: NURSE PRACTITIONER

## 2024-07-22 NOTE — Clinical Note
Please upload rx for supplies with office note ASAP.  Patient needs a mask fitting and supplies ASAP.

## 2024-07-22 NOTE — PROGRESS NOTES
Consultation - [facility]  Sleep Disorders Center  Richard Rogers, 1970, MRN: [id number]    7/22/2024        Reason for Consult / Principal Problem:    Obstructive Sleep Apnea  Restless legs syndrome    CONSULTING PROVIDER:  Demario Varma MD  [address]  [address]  [place] [address]    ASSESSMENT/PLAN:    1. Obstructive sleep apnea syndrome  -     Ambulatory Referral to Sleep Medicine  -     PAP DME Resupply/Reorder  2. Restless leg syndrome  -     Iron Panel (Includes Ferritin, Iron Sat%, Iron, and TIBC); Future  -     Vitamin B12; Future  3. Neuropathy due to chemotherapeutic drug (HCC)       Thank you for the opportunity of participating in the evaluation and care of this patient in the Sleep Clinic at [facility].  If there are any questions regarding this evaluation, please feel free to reach out.   ________________________________________________________________________________________________    HPI: Richard Rogers is a 54 y.o. male with PMHx of colon cancer, neuropathy due to chemotherapeutic agent, anxiety and depression, ADHD, presents for a consultation regarding obstructive sleep apnea of unknown severity and restless legs syndrome.    He initially completed a sleep study in [place] 14 years ago.  He reports that he received his current equipment approximately 2 years ago.  Therapy was going well up until a few weeks ago.  He developed increase in air leaks from the mask and has been struggling to use it.  He also notices redness and soreness across the bridge of his nose.  He has tried cushions to place on the mask cushion, but still has soreness/redness.    He began pramipexole 0.5mg approximately 10 months ago for jerking symptoms in his legs that wake him from sleep.  He began using 0.5mg and dose has not been increased. He noticed some improvement in symptoms after starting it.  He denies any compulsive behaviors  with use of pramipexole or any concern for augmentation.    Sleep-Related History: Prior to use of PAP therapy, he snored loudly with gasping that woke him from sleep.  He also had witnessed apnea.  Without being able to use the CPAP equipment, symptoms have returned.    Prior Sleep Studies:  Results of past sleep study not available.    Current PAP Equipment:  Current type of equipment used:  AirSense 11 - received 2 years ago  Equipment set up date:  unsure  PAP Pressure:  AutoPAP using a lower limit of 10 cm and an upper limit of 15 cm of water pressure  Type of mask used: full face  DME Provider: [facility]    Current PAP Compliance Data:  Average usage:  He has been able to use the equipment 86% of all days recorded.  Average usage was 4 or more hours 73% of all days recorded.  Average hours used:  6 hours and 56 minutes  Average time in an air leak:  worse over past 2 weeks  Average pressure:  11.2 cm of water pressure  Residual AHI:  0.2/hour, including 0.1 OA, 0.0 CA, 0.1 hypopneas, 0.0 unknown      Capitan Sleepiness Scale: Total score: 4/24  Greater or equal to 10 is positive for excessive daytime sleepiness    Pertinent Meds: He takes cymbalta 30mg, pramipexole 0.5mg and lyrica 75mg at bedtime between 8:00-9:00pm      Sleep-Wake Schedule:  He is self-described as a morning person.  Bedtime: 10:00-11:00pm  Wake Time: 7:30am on work days  Difficulty Falling Asleep: Yes, can take 30-45 minutes,   Avg Number of Awakenings: without CPAP - 3-4 times and struggles to return to sleep, can sleep through the night when using CPAP  Cause of Awakenings: unknown  Weekend Sleep Schedule: 7:30-9:30am  Naps: he does not take naps        Avg TST per 24 hours: 7 hours    Sleep-Related Details:  Preferred Sleep Position: supine and side(s)  SDB Symptoms: snoring, witnessed apneas, gasping/choking, multiple awakenings, morning headaches, and waking unrefreshed  Bruxism: Yes, uses oral appliance  Nocturnal GERD:  "No  Nocturnal Palpitations: No  Nocturnal Anxiety or Rumination: No - brain always going from ADHD  Sleep-Related Hallucinations: No   Sleep Paralysis: No   Cataplexy: No         He wakes up with restless leg symptoms including legs jerking around 2:00am.  He denies symptoms in the evening.    He denies any parasomnia activity.    Wake-Related Details  Daytime Sleepiness: No   Drowsy Driving: No  Employment:  currently working full time.  He works M-F between the hours of 9:30am-6:00pm.    CDL license:  No    Caffeine:  limited - occasional    Tobacco:   reports that he has never smoked. He has never used smokeless tobacco.  E-cig/Vaping:    E-Cigarette/Vaping    E-Cigarette Use Never User       E-Cigarette/Vaping Substances    Nicotine No     THC No     CBD No     Flavoring No     Other No     Unknown No       Alcohol:   reports no history of alcohol use. none   Drugs:   reports no history of drug use.  none      Weight Change:   weight has been relatively stable, unsure of weight at the time of the study    He does have difficulty with memory or concentration.      Other Relevant Labs and Studies:  Labs: I have personally reviewed pertinent lab results.  Lab Results   Component Value Date    WBC 6.88 06/10/2024    HGB 14.0 06/10/2024    HCT 40.6 06/10/2024    MCV 94 06/10/2024     06/10/2024      Lab Results   Component Value Date    CALCIUM 9.5 06/10/2024    K 3.7 06/10/2024    CO2 31 06/10/2024     06/10/2024    BUN 13 06/10/2024    CREATININE 0.78 06/10/2024     Magnesium level was 1.8 on 6/10/24    No results found for: \"IRON\", \"TIBC\", \"FERRITIN\"  No results found for: \"UKSERUQF75\"  No results found for: \"FOLATE\"      Past Medical History:   Diagnosis Date    Depression     Sleep difficulties    History reviewed. No pertinent surgical history.  Patient Active Problem List   Diagnosis    Colon cancer metastasized to intra-abdominal lymph node (HCC)    Current mild episode of major depressive " disorder without prior episode (HCC)    Lumbar radiculopathy    Attention deficit hyperactivity disorder (ADHD), predominantly inattentive type    Neuropathy due to chemotherapeutic drug (HCC)    Prediabetes    Other male erectile dysfunction    Mixed hyperlipidemia    Adenocarcinoma of rectosigmoid junction (HCC)    Secondary polycythemia    Anxiety and depression    Port-A-Cath in place    Personal history of rectal cancer    Non-seasonal allergic rhinitis due to pollen    Obstructive sleep apnea syndrome    Cellulitis of toe of left foot    Restless leg syndrome     Allergies as of 07/22/2024    (No Known Allergies)     REVIEW OF SYSTEMS:  Review of Systems  10-point system review completed, all of which are negative except as mentioned above.       CURRENT MEDICATIONS:  Current Outpatient Medications   Medication Instructions    atorvastatin (LIPITOR) 40 mg, Oral, Daily    cephalexin (KEFLEX) 500 mg, Oral, Every 8 hours scheduled    DULoxetine (CYMBALTA) 30 mg, Oral, Daily at bedtime    escitalopram (LEXAPRO) 20 mg, Oral, Daily    Fish Oil 1,200 mg, Oral, Daily    fluticasone (FLONASE) 50 mcg/act nasal spray Take 1 puff each nostril twice a day    lisdexamfetamine (VYVANSE) 70 mg, Oral, Every morning    metFORMIN (GLUCOPHAGE-XR) 750 mg, Oral, Daily with breakfast    montelukast (SINGULAIR) 10 mg, Oral, Daily at bedtime    omeprazole (PRILOSEC) 20 mg, Oral, Daily    pramipexole (MIRAPEX) 0.5 mg, Oral, Daily at bedtime    pregabalin (LYRICA) 75 mg capsule Take 1 pill at bedtime for 2 weeks and then take 1 pill twice a day    vardenafil (LEVITRA) 20 mg, Oral, Daily PRN    vitamin C 1,000 mg, Oral, Daily    zinc gluconate 50 mg, Oral, Daily     SOCIAL HISTORY:  Social History     Tobacco Use    Smoking status: Never    Smokeless tobacco: Never   Vaping Use    Vaping status: Never Used   Substance Use Topics    Alcohol use: Never    Drug use: Never     FAMILY HISTORY:  History reviewed. No pertinent family  "history.  Family History of Sleep Disorders: mother had IVAN    Objective:  Vital Signs:  /68   Pulse 94   Resp 18   Ht 5' 11\" (1.803 m)   Wt 90.3 kg (199 lb)   SpO2 99%   BMI 27.75 kg/m²   Body mass index is 27.75 kg/m².  Neck Circumference:   inches      PHYSICAL EXAMINATION:    Constitutional: Alert, cooperative, no distress, appears stated age  Skin: Warm, dry, no rashes noted  Eyes: Conjunctiva/corneas clear  ENT: Nasal congestion absent, nares asymmetric, septum deviated, mucosa normal  Posterior Airspace:   Mathis Tongue Position: 2  Retrognathia: present  Overbite: absent  High Arched Palate: absent  Tongue Scalloping/Ridging: absent  Tonsils: 2+  Uvula: normal  Lungs: Clear to auscultation bilaterally, respirations unlabored  Heart: normal rate and regular rhythm, S1/2 normal, no murmur noted, no rub or gallop  Extremities: Normal, no digital clubbing, no pedal edema  Neuro: No focal deficits noted      The review of systems and following portions of the patient's history were reviewed and updated as appropriate: allergies, current medications, past family history, past medical history, past social history, past surgical history and problem list.    _____________________________________      I have spent a total time of 55 minutes on 07/22/24 in caring for this patient including Risks and benefits of tx options, Instructions for management, Patient and family education, Importance of tx compliance, Risk factor reductions, Impressions, Counseling / Coordination of care, Documenting in the medical record, Reviewing / ordering tests, medicine, procedures  , and Obtaining or reviewing history  .    The patient feels they benefit from the use of PAP equipment and would like to continue PAP therapy.    Response to treatment has been good.    The patient is at goal for hours of PAP use and at goal for effectiveness of treatment.  We discussed that once he receives supplies and has a mask fitting " tomorrow, he will likely return to comfortable use of PAP therapy.  He was advised to call if he still has any difficulty.  A prescription for supplies has been provided to last for the next year.    He will complete lab testing regarding restless leg symptoms, which may be overlapping neuropathy from chemo.    We discussed changing cymbalta to am, as it can worsen restless leg symptoms.    He will continue using this equipment at the settings noted above for the next 3-4 months.  At that time he will then return for a routine follow-up evaluation. I have asked the patient to contact the Sleep Disorders Center if he encounters any difficulties prior to that time.    The following instructions have been given to the patient today:    Patient Instructions    Try changing cymbalta timing to the morning  Complete lab testing regarding restless leg symptoms - fasting, if possible  Schedule mask fitting or walk in to [facility] on [address]  Restart use of CPAP equipment  Schedule follow up visit in 3-4 months.    Recommend restarting [employer] pankaj and view events. If events are consistently higher than 5, call with an update.  Call the Sleep Center if your new mask is causing air leaks along the nasal      Nursing Support:  When:  Monday through Friday 7:00am-5:00pm, except holidays  Where:  Direct phone line is [phone], option 3  If you are having a true emergency, please call 911.  In the event that the line is busy or it is after hours, please leave a voice mail message and we will return your call.  Please speak clearly, leaving your full name, birth date, best number to reach you and the reason for your call.  Medication refills:  We will need the name of the medication, the dosage, the ordering provider, whether you get a 30 day or 90 day refill and the pharmacy name and address.  Medications will be ordered by the providers only.  Nurses cannot call in prescriptions.    To reach the Sleep  "Disorders Center office staff:  Call [phone], option 1, followed by option 3    MARY Gallego  [facility]    Portions of the record may have been created with voice recognition software. Occasional wrong word or \"sound a like\" substitutions may have occurred due to the inherent limitations of voice recognition software. Please read the chart carefully and recognize, using context, where substitutions have occurred.                                             "

## 2024-07-22 NOTE — PATIENT INSTRUCTIONS
Try changing cymbalta timing to the morning  Complete lab testing regarding restless leg symptoms - fasting, if possible  Schedule mask fitting or walk in to [facility] on [address] [facility]  Restart use of CPAP equipment  Schedule follow up visit in 3-4 months.    Recommend restarting [employer] pankaj and view events. If events are consistently higher than 5, call with an update.  Call the Sleep Center if your new mask is causing air leaks along the nasal      Nursing Support:  When:  Monday through Friday 7:00am-5:00pm, except holidays  Where:  Direct phone line is [phone], option 3  If you are having a true emergency, please call 911.  In the event that the line is busy or it is after hours, please leave a voice mail message and we will return your call.  Please speak clearly, leaving your full name, birth date, best number to reach you and the reason for your call.  Medication refills:  We will need the name of the medication, the dosage, the ordering provider, whether you get a 30 day or 90 day refill and the pharmacy name and address.  Medications will be ordered by the providers only.  Nurses cannot call in prescriptions.    To reach the Sleep Disorders Center office staff:  Call [phone], option 1, followed by option 3

## 2024-07-23 ENCOUNTER — TELEPHONE (OUTPATIENT)
Dept: SLEEP CENTER | Facility: CLINIC | Age: 54
End: 2024-07-23

## 2024-07-23 NOTE — TELEPHONE ENCOUNTER
Rx for PAP resupply and face to face note faxed to [facility].  Noted on fax to expedite supplies and that patient currently uses [facility] for DME.    Per chart, patient is scheduled for mask fitting today at 8:30am.  Provided copy of resupply Rx to Sahil Romano, [employer] liaison in the [place] office.

## 2024-07-23 NOTE — TELEPHONE ENCOUNTER
----- Message from MARY Gallego sent at 7/22/2024  3:45 PM EDT -----  Please upload rx for supplies with office note ASAP.  Patient needs a mask fitting and supplies ASAP.

## 2024-07-26 ENCOUNTER — TELEPHONE (OUTPATIENT)
Dept: SLEEP CENTER | Facility: CLINIC | Age: 54
End: 2024-07-26

## 2024-07-26 NOTE — TELEPHONE ENCOUNTER
Rx for resupply and clinicals sent to "[employer] [facility] via faxed.

## 2024-07-29 ENCOUNTER — HOSPITAL ENCOUNTER (OUTPATIENT)
Dept: INFUSION CENTER | Facility: CLINIC | Age: 54
Discharge: HOME | End: 2024-07-29
Payer: COMMERCIAL

## 2024-07-29 DIAGNOSIS — R73.03 PREDIABETES: ICD-10-CM

## 2024-07-29 DIAGNOSIS — C18.9 COLON CANCER METASTASIZED TO INTRA-ABDOMINAL LYMPH NODE (HCC): ICD-10-CM

## 2024-07-29 DIAGNOSIS — G62.0 NEUROPATHY DUE TO CHEMOTHERAPEUTIC DRUG (HCC): ICD-10-CM

## 2024-07-29 DIAGNOSIS — Z95.828 PORT-A-CATH IN PLACE: Primary | ICD-10-CM

## 2024-07-29 DIAGNOSIS — C19 ADENOCARCINOMA OF RECTOSIGMOID JUNCTION (HCC): ICD-10-CM

## 2024-07-29 DIAGNOSIS — G25.81 RESTLESS LEG SYNDROME: ICD-10-CM

## 2024-07-29 DIAGNOSIS — T45.1X5A NEUROPATHY DUE TO CHEMOTHERAPEUTIC DRUG (HCC): ICD-10-CM

## 2024-07-29 DIAGNOSIS — C77.2 COLON CANCER METASTASIZED TO INTRA-ABDOMINAL LYMPH NODE (HCC): ICD-10-CM

## 2024-07-29 LAB
CEA SERPL-MCNC: 1.6 NG/ML (ref 0–3)
FERRITIN SERPL-MCNC: 29 NG/ML (ref 24–336)
IRON SATN MFR SERPL: 23 % (ref 15–50)
IRON SERPL-MCNC: 96 UG/DL (ref 50–212)
TIBC SERPL-MCNC: 422 UG/DL (ref 250–450)
UIBC SERPL-MCNC: 326 UG/DL (ref 155–355)
VIT B12 SERPL-MCNC: 417 PG/ML (ref 180–914)

## 2024-07-29 PROCEDURE — 82378 CARCINOEMBRYONIC ANTIGEN: CPT

## 2024-07-29 PROCEDURE — 82607 VITAMIN B-12: CPT

## 2024-07-29 PROCEDURE — 83550 IRON BINDING TEST: CPT

## 2024-07-29 PROCEDURE — 83540 ASSAY OF IRON: CPT

## 2024-07-29 PROCEDURE — 83036 HEMOGLOBIN GLYCOSYLATED A1C: CPT

## 2024-07-29 PROCEDURE — 82728 ASSAY OF FERRITIN: CPT

## 2024-07-29 NOTE — PROGRESS NOTES
Pt to clinic for port flush and lab draw via port, offers no complaints today, tolerated procedure without complications, aware of next appointment on 10/21/24 at 5pm, port de-accessed, avs declined.

## 2024-07-30 LAB
EST. AVERAGE GLUCOSE BLD GHB EST-MCNC: 128 MG/DL
HBA1C MFR BLD: 6.1 %

## 2024-07-30 PROCEDURE — 3044F HG A1C LEVEL LT 7.0%: CPT | Performed by: NURSE PRACTITIONER

## 2024-07-30 RX ORDER — PREGABALIN 75 MG/1
CAPSULE ORAL
Qty: 60 CAPSULE | Refills: 2 | Status: SHIPPED | OUTPATIENT
Start: 2024-07-30

## 2024-08-18 DIAGNOSIS — R73.03 PREDIABETES: ICD-10-CM

## 2024-08-18 DIAGNOSIS — L03.032 CELLULITIS OF TOE OF LEFT FOOT: ICD-10-CM

## 2024-08-19 RX ORDER — METFORMIN HYDROCHLORIDE 750 MG/1
750 TABLET, EXTENDED RELEASE ORAL
Qty: 30 TABLET | Refills: 5 | Status: SHIPPED | OUTPATIENT
Start: 2024-08-19

## 2024-08-19 RX ORDER — CEPHALEXIN 500 MG/1
CAPSULE ORAL
Qty: 21 CAPSULE | Refills: 0 | Status: SHIPPED | OUTPATIENT
Start: 2024-08-19 | End: 2025-02-15

## 2024-08-25 ENCOUNTER — PATIENT MESSAGE (OUTPATIENT)
Dept: INTERNAL MEDICINE CLINIC | Facility: CLINIC | Age: 54
End: 2024-08-25

## 2024-08-30 ENCOUNTER — TELEPHONE (OUTPATIENT)
Dept: SLEEP CENTER | Facility: CLINIC | Age: 54
End: 2024-08-30

## 2024-08-30 ENCOUNTER — PATIENT MESSAGE (OUTPATIENT)
Dept: SLEEP CENTER | Facility: CLINIC | Age: 54
End: 2024-08-30

## 2024-08-30 DIAGNOSIS — G47.33 OBSTRUCTIVE SLEEP APNEA SYNDROME: Primary | ICD-10-CM

## 2024-08-30 NOTE — TELEPHONE ENCOUNTER
Rx for PAP resupply including nasal pillows faxed to [employer].  Noted on fax that patient's supplier is [employer].

## 2024-08-30 NOTE — PATIENT COMMUNICATION
Called and spoke with patient offered a 3:00 pm appt on Wednesday September 4th and patient declined to come in for an appt and a virtual appt due to his work schedule. Patient was scheduled for 10/2 at 5:00 pm.

## 2024-09-07 ENCOUNTER — PATIENT MESSAGE (OUTPATIENT)
Dept: SLEEP CENTER | Facility: CLINIC | Age: 54
End: 2024-09-07

## 2024-09-13 DIAGNOSIS — F32.A DEPRESSION, UNSPECIFIED DEPRESSION TYPE: ICD-10-CM

## 2024-09-15 RX ORDER — ESCITALOPRAM OXALATE 20 MG/1
20 TABLET ORAL DAILY
Qty: 90 TABLET | Refills: 1 | Status: SHIPPED | OUTPATIENT
Start: 2024-09-15

## 2024-09-16 DIAGNOSIS — G25.81 RESTLESS LEG: ICD-10-CM

## 2024-09-16 DIAGNOSIS — R73.03 PREDIABETES: ICD-10-CM

## 2024-09-16 RX ORDER — PRAMIPEXOLE DIHYDROCHLORIDE 0.5 MG/1
0.5 TABLET ORAL
Qty: 90 TABLET | Refills: 1 | Status: SHIPPED | OUTPATIENT
Start: 2024-09-16

## 2024-09-16 RX ORDER — METFORMIN HYDROCHLORIDE 750 MG/1
750 TABLET, EXTENDED RELEASE ORAL
Qty: 90 TABLET | Refills: 1 | Status: SHIPPED | OUTPATIENT
Start: 2024-09-16

## 2024-09-26 ENCOUNTER — TELEPHONE (OUTPATIENT)
Age: 54
End: 2024-09-26

## 2024-10-02 ENCOUNTER — TELEPHONE (OUTPATIENT)
Dept: HEMATOLOGY ONCOLOGY | Facility: CLINIC | Age: 54
End: 2024-10-02

## 2024-10-02 NOTE — TELEPHONE ENCOUNTER
LM for patient that due to a change in the provider's schedule, his appt. On 10/29/24 with Dr. Ashley has been rescheduled to 11/12/24.

## 2024-10-03 DIAGNOSIS — J30.1 NON-SEASONAL ALLERGIC RHINITIS DUE TO POLLEN: ICD-10-CM

## 2024-10-03 RX ORDER — FLUTICASONE PROPIONATE 50 MCG
SPRAY, SUSPENSION (ML) NASAL
Qty: 48 ML | Refills: 1 | Status: SHIPPED | OUTPATIENT
Start: 2024-10-03

## 2024-10-19 NOTE — DISCHARGE INSTRUCTIONS
Please keep area of nail removal clean and dry.  You may use an ointment like Neosporin or bacitracin.    Please follow-up with podiatry for reassessment and management of recurrent ingrown toenails.    Thank you for allowing us to take part in your care.   > LDSS ACHS  -a1c 6.1 > LDSS ACHS  -A1c 6.1

## 2024-10-21 ENCOUNTER — HOSPITAL ENCOUNTER (OUTPATIENT)
Dept: INFUSION CENTER | Facility: CLINIC | Age: 54
Discharge: HOME | End: 2024-10-21
Payer: COMMERCIAL

## 2024-10-21 ENCOUNTER — HOSPITAL ENCOUNTER (OUTPATIENT)
Dept: RADIOLOGY | Age: 54
Discharge: HOME | End: 2024-10-21
Payer: COMMERCIAL

## 2024-10-21 DIAGNOSIS — C20 RECTAL CANCER (HCC): ICD-10-CM

## 2024-10-21 DIAGNOSIS — Z95.828 PORT-A-CATH IN PLACE: Primary | ICD-10-CM

## 2024-10-21 DIAGNOSIS — C19 ADENOCARCINOMA OF RECTOSIGMOID JUNCTION (HCC): ICD-10-CM

## 2024-10-21 PROCEDURE — 74177 CT ABD & PELVIS W/CONTRAST: CPT

## 2024-10-21 PROCEDURE — 36593 DECLOT VASCULAR DEVICE: CPT

## 2024-10-21 PROCEDURE — 71260 CT THORAX DX C+: CPT

## 2024-10-21 RX ADMIN — ALTEPLASE 2 MG: 2.2 INJECTION, POWDER, LYOPHILIZED, FOR SOLUTION INTRAVENOUS at 16:54

## 2024-10-21 RX ADMIN — IOHEXOL 100 ML: 350 INJECTION, SOLUTION INTRAVENOUS at 15:41

## 2024-10-21 NOTE — PROGRESS NOTES
Pt to clinic for port flush, offers no complaints today, denies needing any labs drawn at this time, upon accessing, pt's port did not give blood return, TPA placed per protocol, after 30 minutes of dwelling time pt's port gave excellent blood return, pt tolerated procedure without complications, aware he will be called with next appointment, port de-accessed, avs declined.

## 2024-10-23 ENCOUNTER — APPOINTMENT (OUTPATIENT)
Age: 54
End: 2024-10-23

## 2024-10-31 ENCOUNTER — TELEPHONE (OUTPATIENT)
Age: 54
End: 2024-10-31

## 2024-10-31 NOTE — TELEPHONE ENCOUNTER
Patient returned a call, he was told to schedule an AWV but he no longer has Medicare and is in the middle of changing insurance, so he cannot schedule any physical appts at this time.

## 2024-11-04 ENCOUNTER — TELEPHONE (OUTPATIENT)
Dept: SLEEP CENTER | Facility: CLINIC | Age: 54
End: 2024-11-04

## 2024-11-04 DIAGNOSIS — F32.A DEPRESSION, UNSPECIFIED DEPRESSION TYPE: ICD-10-CM

## 2024-11-04 RX ORDER — ESCITALOPRAM OXALATE 20 MG/1
20 TABLET ORAL DAILY
Qty: 90 TABLET | Refills: 0 | Status: SHIPPED | OUTPATIENT
Start: 2024-11-04

## 2024-11-04 NOTE — TELEPHONE ENCOUNTER
Per [employer] message:  I need to go to a full face mask. Apparently, the other ones haven’t sealed well or my mouth keeps opening. I never got the chin strap with the nasal pillows also I needed a small. Which ever you think best. Thank you   --------------------------------------------    Rx for PAP supplies re-sent to [facility] via [employer].

## 2024-11-07 LAB
DME PARACHUTE DELIVERY DATE REQUESTED: NORMAL
DME PARACHUTE ITEM DESCRIPTION: NORMAL
DME PARACHUTE ORDER STATUS: NORMAL
DME PARACHUTE SUPPLIER NAME: NORMAL
DME PARACHUTE SUPPLIER PHONE: NORMAL

## 2024-11-10 DIAGNOSIS — G62.0 NEUROPATHY DUE TO CHEMOTHERAPEUTIC DRUG (HCC): ICD-10-CM

## 2024-11-10 DIAGNOSIS — F32.0 CURRENT MILD EPISODE OF MAJOR DEPRESSIVE DISORDER WITHOUT PRIOR EPISODE (HCC): ICD-10-CM

## 2024-11-10 DIAGNOSIS — J30.1 NON-SEASONAL ALLERGIC RHINITIS DUE TO POLLEN: ICD-10-CM

## 2024-11-10 DIAGNOSIS — T45.1X5A NEUROPATHY DUE TO CHEMOTHERAPEUTIC DRUG (HCC): ICD-10-CM

## 2024-11-11 RX ORDER — PREGABALIN 75 MG/1
CAPSULE ORAL
Qty: 60 CAPSULE | Refills: 0 | Status: SHIPPED | OUTPATIENT
Start: 2024-11-11

## 2024-11-11 RX ORDER — DULOXETIN HYDROCHLORIDE 30 MG/1
30 CAPSULE, DELAYED RELEASE ORAL
Qty: 90 CAPSULE | Refills: 1 | Status: SHIPPED | OUTPATIENT
Start: 2024-11-11

## 2024-11-11 RX ORDER — MONTELUKAST SODIUM 10 MG/1
10 TABLET ORAL
Qty: 90 TABLET | Refills: 1 | Status: SHIPPED | OUTPATIENT
Start: 2024-11-11

## 2024-11-15 ENCOUNTER — HOSPITAL ENCOUNTER (EMERGENCY)
Facility: HOSPITAL | Age: 54
Discharge: HOME | End: 2024-11-15
Attending: EMERGENCY MEDICINE
Payer: COMMERCIAL

## 2024-11-15 VITALS
HEART RATE: 62 BPM | RESPIRATION RATE: 18 BRPM | OXYGEN SATURATION: 94 % | TEMPERATURE: 97.9 F | SYSTOLIC BLOOD PRESSURE: 127 MMHG | DIASTOLIC BLOOD PRESSURE: 65 MMHG

## 2024-11-15 DIAGNOSIS — L03.032 PARONYCHIA OF SECOND TOE OF LEFT FOOT: Primary | ICD-10-CM

## 2024-11-15 PROCEDURE — 99283 EMERGENCY DEPT VISIT LOW MDM: CPT

## 2024-11-15 PROCEDURE — 99284 EMERGENCY DEPT VISIT MOD MDM: CPT | Performed by: NURSE PRACTITIONER

## 2024-11-15 RX ORDER — CEPHALEXIN 500 MG/1
500 CAPSULE ORAL EVERY 8 HOURS SCHEDULED
Qty: 30 CAPSULE | Refills: 0 | Status: SHIPPED | OUTPATIENT
Start: 2024-11-15 | End: 2024-11-25

## 2024-11-15 RX ORDER — HYDROCODONE BITARTRATE AND ACETAMINOPHEN 5; 325 MG/1; MG/1
1 TABLET ORAL EVERY 6 HOURS PRN
Qty: 12 TABLET | Refills: 0 | Status: SHIPPED | OUTPATIENT
Start: 2024-11-15

## 2024-11-15 NOTE — ED PROVIDER NOTES
Time reflects when diagnosis was documented in both MDM as applicable and the Disposition within this note       Time User Action Codes Description Comment    11/15/2024  9:20 AM Timoteo Pang Add [L03.032] Paronychia of second toe of left foot           ED Disposition       ED Disposition   Discharge    Condition   Stable    Date/Time   Fri Nov 15, 2024  9:19 AM    Comment   Richard Rogers discharge to home/self care.                   Assessment & Plan       Medical Decision Making  Nothing that would suggest ingrown toenail.  Symptoms are more consistent with paronychia.  Begin antibiotics and soaking.  Return precautions discussed.    Risk  Prescription drug management.             Medications - No data to display    ED Risk Strat Scores                           SBIRT 22yo+      Flowsheet Row Most Recent Value   Initial Alcohol Screen: US AUDIT-C     1. How often do you have a drink containing alcohol? 0 Filed at: 11/15/2024 0918   2. How many drinks containing alcohol do you have on a typical day you are drinking?  0 Filed at: 11/15/2024 0918   3a. Male UNDER 65: How often do you have five or more drinks on one occasion? 0 Filed at: 11/15/2024 0918   Audit-C Score 0 Filed at: 11/15/2024 0918   SHARRON: How many times in the past year have you...    Used an illegal drug or used a prescription medication for non-medical reasons? Never Filed at: 11/15/2024 0918                            History of Present Illness       Chief Complaint   Patient presents with    Toe Pain     Pt reports left second digit pain for the past few days, possibly ingrown nail with hx of same.       Past Medical History:   Diagnosis Date    Depression     Sleep difficulties       History reviewed. No pertinent surgical history.   History reviewed. No pertinent family history.   Social History     Tobacco Use    Smoking status: Never    Smokeless tobacco: Never   Vaping Use    Vaping status: Never Used   Substance Use Topics    Alcohol use:  Never    Drug use: Never      E-Cigarette/Vaping    E-Cigarette Use Never User       E-Cigarette/Vaping Substances    Nicotine No     THC No     CBD No     Flavoring No     Other No     Unknown No       I have reviewed and agree with the history as documented.     54-year-old male patient presenting here with a chief complaint of left second toe pain for the last few days.  He denies any injury to the area.  He has some erythema over the nailbed which is raising concern for paronychia.  Doubt the patient has ingrown toenail of the second digit also there is really minimal inflammation medially or laterally it is mostly posterior over the nail fold.      Toe Pain  Associated symptoms: rash    Associated symptoms: no abdominal pain, no chest pain, no cough, no ear pain, no fever, no shortness of breath, no sore throat and no vomiting        Review of Systems   Constitutional:  Negative for chills and fever.   HENT:  Negative for ear pain and sore throat.    Eyes:  Negative for pain and visual disturbance.   Respiratory:  Negative for cough and shortness of breath.    Cardiovascular:  Negative for chest pain and palpitations.   Gastrointestinal:  Negative for abdominal pain and vomiting.   Genitourinary:  Negative for dysuria and hematuria.   Musculoskeletal:  Negative for arthralgias and back pain.   Skin:  Positive for rash. Negative for color change.   Neurological:  Negative for seizures and syncope.   All other systems reviewed and are negative.          Objective       ED Triage Vitals [11/15/24 0901]   Temperature Pulse Blood Pressure Respirations SpO2 Patient Position - Orthostatic VS   97.9 °F (36.6 °C) 62 127/65 18 94 % Sitting      Temp Source Heart Rate Source BP Location FiO2 (%) Pain Score    Tympanic Monitor Left arm -- --      Vitals      Date and Time Temp Pulse SpO2 Resp BP Pain Score FACES Pain Rating User   11/15/24 0901 97.9 °F (36.6 °C) 62 94 % 18 127/65 -- -- MR            Physical Exam  Vitals  and nursing note reviewed.   Constitutional:       General: He is not in acute distress.     Appearance: He is well-developed.   HENT:      Head: Normocephalic and atraumatic.      Nose: No rhinorrhea.   Eyes:      General:         Right eye: No discharge.         Left eye: No discharge.      Conjunctiva/sclera: Conjunctivae normal.   Cardiovascular:      Rate and Rhythm: Normal rate.   Pulmonary:      Effort: Pulmonary effort is normal. No respiratory distress.   Abdominal:      General: There is no distension.      Tenderness: There is no guarding.   Musculoskeletal:         General: No deformity.      Cervical back: Normal range of motion and neck supple.      Comments: Left second toe paronychia   Skin:     General: Skin is warm and dry.      Coloration: Skin is not pale.   Neurological:      Mental Status: He is alert and oriented to person, place, and time.      Coordination: Coordination normal.   Psychiatric:         Mood and Affect: Mood normal.         Results Reviewed       None            No orders to display       Procedures    ED Medication and Procedure Management   Prior to Admission Medications   Prescriptions Last Dose Informant Patient Reported? Taking?   Ascorbic Acid (vitamin C) 1000 MG tablet  Self Yes No   Sig: Take 1,000 mg by mouth daily   DULoxetine (Cymbalta) 30 mg delayed release capsule   No No   Sig: Take 1 capsule (30 mg total) by mouth daily at bedtime   Omega-3 Fatty Acids (Fish Oil) 1200 MG CAPS  Self Yes No   Sig: Take 1,200 mg by mouth in the morning   atorvastatin (LIPITOR) 40 mg tablet  Self No No   Sig: take 1 tablet by mouth once daily   cephalexin (KEFLEX) 500 mg capsule   No No   Sig: take 1 capsule by mouth every 8 hours for 7 days   escitalopram (LEXAPRO) 20 mg tablet   No No   Sig: Take 1 tablet (20 mg total) by mouth daily   fluticasone (FLONASE) 50 mcg/act nasal spray   No No   Sig: instill 1 sprays into each nostril twice a day   lisdexamfetamine (VYVANSE) 70 MG  capsule   No No   Sig: Take 1 capsule (70 mg total) by mouth every morning   metFORMIN (GLUCOPHAGE-XR) 750 mg 24 hr tablet   No No   Sig: take 1 tablet by mouth once daily with breakfast   montelukast (SINGULAIR) 10 mg tablet   No No   Sig: Take 1 tablet (10 mg total) by mouth daily at bedtime   omeprazole (PriLOSEC) 20 mg delayed release capsule  Self Yes No   Sig: Take 20 mg by mouth daily   pramipexole (MIRAPEX) 0.5 mg tablet   No No   Sig: take 1 tablet by mouth at bedtime   pregabalin (LYRICA) 75 mg capsule   No No   Sig: Take one capsule twice a day   vardenafil (LEVITRA) 20 MG tablet  Self No No   Sig: Take 1 tablet (20 mg total) by mouth daily as needed for erectile dysfunction   zinc gluconate 50 mg tablet  Self Yes No   Sig: Take 50 mg by mouth daily      Facility-Administered Medications: None     Discharge Medication List as of 11/15/2024  9:21 AM        START taking these medications    Details   !! cephalexin (KEFLEX) 500 mg capsule Take 1 capsule (500 mg total) by mouth every 8 (eight) hours for 10 days, Starting Fri 11/15/2024, Until Mon 11/25/2024, Normal      HYDROcodone-acetaminophen (NORCO) 5-325 mg per tablet Take 1 tablet by mouth every 6 (six) hours as needed for pain for up to 12 doses Max Daily Amount: 4 tablets, Starting Fri 11/15/2024, Normal       !! - Potential duplicate medications found. Please discuss with provider.        CONTINUE these medications which have NOT CHANGED    Details   Ascorbic Acid (vitamin C) 1000 MG tablet Take 1,000 mg by mouth daily, Historical Med      atorvastatin (LIPITOR) 40 mg tablet take 1 tablet by mouth once daily, Starting Wed 5/22/2024, Normal      !! cephalexin (KEFLEX) 500 mg capsule take 1 capsule by mouth every 8 hours for 7 days, Normal      DULoxetine (Cymbalta) 30 mg delayed release capsule Take 1 capsule (30 mg total) by mouth daily at bedtime, Starting Mon 11/11/2024, Normal      escitalopram (LEXAPRO) 20 mg tablet Take 1 tablet (20 mg total) by  mouth daily, Starting Mon 11/4/2024, Normal      fluticasone (FLONASE) 50 mcg/act nasal spray instill 1 sprays into each nostril twice a day, Normal      lisdexamfetamine (VYVANSE) 70 MG capsule Take 1 capsule (70 mg total) by mouth every morning, Starting Wed 7/17/2024, Normal      metFORMIN (GLUCOPHAGE-XR) 750 mg 24 hr tablet take 1 tablet by mouth once daily with breakfast, Starting Mon 9/16/2024, Normal      montelukast (SINGULAIR) 10 mg tablet Take 1 tablet (10 mg total) by mouth daily at bedtime, Starting Mon 11/11/2024, Normal      Omega-3 Fatty Acids (Fish Oil) 1200 MG CAPS Take 1,200 mg by mouth in the morning, Historical Med      omeprazole (PriLOSEC) 20 mg delayed release capsule Take 20 mg by mouth daily, Historical Med      pramipexole (MIRAPEX) 0.5 mg tablet take 1 tablet by mouth at bedtime, Starting Mon 9/16/2024, Normal      pregabalin (LYRICA) 75 mg capsule Take one capsule twice a day, Normal      vardenafil (LEVITRA) 20 MG tablet Take 1 tablet (20 mg total) by mouth daily as needed for erectile dysfunction, Starting Mon 11/20/2023, Normal      zinc gluconate 50 mg tablet Take 50 mg by mouth daily, Historical Med       !! - Potential duplicate medications found. Please discuss with provider.        No discharge procedures on file.  ED SEPSIS DOCUMENTATION   Time reflects when diagnosis was documented in both MDM as applicable and the Disposition within this note       Time User Action Codes Description Comment    11/15/2024  9:20 AM Timoteo Pang Add [L03.032] Paronychia of second toe of left foot                  MARY Slaughter  11/15/24 7267

## 2024-11-20 ENCOUNTER — TELEPHONE (OUTPATIENT)
Dept: SLEEP CENTER | Facility: CLINIC | Age: 54
End: 2024-11-20

## 2024-11-20 DIAGNOSIS — G47.33 OBSTRUCTIVE SLEEP APNEA SYNDROME: Primary | ICD-10-CM

## 2024-11-20 NOTE — TELEPHONE ENCOUNTER
Richard Rogers to  Sleep Medicine [facility] (supporting MARY Gallego)   11/19/24 10:02 PM  I need to go back to a full face mask. The nose pillows is making the inside of my nose sore and my mouth keeps opening up and is affecting my fiancé sleep. Also, can the full face mask be sent to my new address [place] [address] [place] [id number]. Thank you.    -----------------------------------------------------------    Nalini, please write Rx for [facility].

## 2024-11-25 ENCOUNTER — APPOINTMENT (OUTPATIENT)
Age: 54
End: 2024-11-25

## 2024-11-25 DIAGNOSIS — R10.13 DYSPEPSIA: Primary | ICD-10-CM

## 2024-11-25 RX ORDER — SIMETHICONE 180 MG
180 CAPSULE ORAL EVERY 6 HOURS PRN
Qty: 120 CAPSULE | Refills: 0 | Status: SHIPPED | OUTPATIENT
Start: 2024-11-25

## 2024-11-26 DIAGNOSIS — E78.2 MIXED HYPERLIPIDEMIA: ICD-10-CM

## 2024-11-26 NOTE — TELEPHONE ENCOUNTER
Left message medication for the gas was sent to the pharmacy any questions please give us a call back.

## 2024-11-27 DIAGNOSIS — Z00.6 ENCOUNTER FOR EXAMINATION FOR NORMAL COMPARISON OR CONTROL IN CLINICAL RESEARCH PROGRAM: ICD-10-CM

## 2024-11-27 RX ORDER — ATORVASTATIN CALCIUM 40 MG/1
40 TABLET, FILM COATED ORAL DAILY
Qty: 30 TABLET | Refills: 0 | Status: SHIPPED | OUTPATIENT
Start: 2024-11-27

## 2024-12-02 ENCOUNTER — HOSPITAL ENCOUNTER (OUTPATIENT)
Dept: INFUSION CENTER | Facility: CLINIC | Age: 54
Discharge: HOME | End: 2024-12-02

## 2024-12-06 ENCOUNTER — TELEPHONE (OUTPATIENT)
Age: 54
End: 2024-12-06

## 2024-12-06 NOTE — TELEPHONE ENCOUNTER
"Behavioral Health Outpatient Intake Questions    Referred By   :     Please advise interviewee that they need to answer all questions truthfully to allow for best care, and any misrepresentations of information may affect their ability to be seen at this clinic   => Was this discussed? Yes       Behavioral Health Outpatient Intake History-    Presenting Problem (in patient's own words): Severe depression, and has had suicidal thoughts.     Are there any communication barriers for this patient?     Yes                                               If yes, please describe barriers: ADHD  If there is a unique situation, please refer to [facility]/Vijaya Mendez for final determination.    Are you taking any psychiatric medications? Yes     If \"YES\" -What are they ? Unsure but is on anti-depressants     If \"YES\" -Who prescribes? PCP      Has the Patient previously received outpatient Talk Therapy or Medication Management from [facility]  No        If \"YES\"- When, Where and with Whom?         If \"NO\" -Has Patient received these services elsewhere?       If \"YES\" -When, Where, and with Whom?  Has the Patient abused alcohol or other substances in the last 6 months ? No  No concerns of substance abuse are reported.     If \"YES\" -What substance, How much, How often?     If illegal substance: Refer to [facility] (for DANITZA) or [facility].   If Alcohol in excess of 10 drinks per week:  Refer to [facility] (for DANITZA) or [facility]    Legal History-     Is this treatment court ordered? No   If \"yes \"send to :  Talk Therapy : Send to [facility] for final determination   Med Management: Send to Dr. Dexter for final determination     Has the Patient been convicted of a felony?  No   If \"Yes\" send to -When, What?  Talk Therapy: Send to [facility] for final determination   Med Management: Send to Dr. Dexter for final determination     ACCEPTED as a patient Yes  If \"Yes\" Appointment Date:   MM: 1/30 10AM "   TT: 3/10 9AM yLly BAZAN    Referred Elsewhere?   If “Yes” - (Where? Ex: [facility], [facility], [facility], [facility], etc.)       Name of Insurance Co:[employer]   Insurance ID#[id number]   Insurance Phone #  If ins is primary or secondary?  If patient is a minor, parents information such as Name, D.O.B of guarantor.

## 2024-12-06 NOTE — TELEPHONE ENCOUNTER
Contacted Pt . in regards to TT//MM Wait List, LVM for pt. to contact [phone], option 3 in regards to scheduling.

## 2024-12-08 DIAGNOSIS — G62.0 NEUROPATHY DUE TO CHEMOTHERAPEUTIC DRUG (HCC): ICD-10-CM

## 2024-12-08 DIAGNOSIS — E78.2 MIXED HYPERLIPIDEMIA: ICD-10-CM

## 2024-12-08 DIAGNOSIS — T45.1X5A NEUROPATHY DUE TO CHEMOTHERAPEUTIC DRUG (HCC): ICD-10-CM

## 2024-12-09 RX ORDER — PREGABALIN 75 MG/1
75 CAPSULE ORAL 2 TIMES DAILY
Qty: 60 CAPSULE | Refills: 0 | Status: SHIPPED | OUTPATIENT
Start: 2024-12-09

## 2024-12-09 RX ORDER — ATORVASTATIN CALCIUM 40 MG/1
40 TABLET, FILM COATED ORAL DAILY
Qty: 90 TABLET | Refills: 1 | Status: SHIPPED | OUTPATIENT
Start: 2024-12-09

## 2024-12-10 ENCOUNTER — TELEPHONE (OUTPATIENT)
Dept: PSYCHIATRY | Facility: CLINIC | Age: 54
End: 2024-12-10

## 2024-12-16 ENCOUNTER — HOSPITAL ENCOUNTER (OUTPATIENT)
Dept: INFUSION CENTER | Facility: CLINIC | Age: 54
Discharge: HOME | End: 2024-12-16

## 2024-12-16 ENCOUNTER — TELEPHONE (OUTPATIENT)
Age: 54
End: 2024-12-16

## 2024-12-16 NOTE — TELEPHONE ENCOUNTER
Patient called to  NP appt with Austin Chong. However there were no NP slots available earlier for the patient. Patient will speak to employer to try and get time off to attend appt.

## 2024-12-17 ENCOUNTER — HOSPITAL ENCOUNTER (OUTPATIENT)
Dept: INFUSION CENTER | Facility: CLINIC | Age: 54
Discharge: HOME | End: 2024-12-17
Payer: COMMERCIAL

## 2024-12-17 ENCOUNTER — RESULTS FOLLOW-UP (OUTPATIENT)
Dept: INTERNAL MEDICINE CLINIC | Facility: CLINIC | Age: 54
End: 2024-12-17

## 2024-12-17 ENCOUNTER — TELEPHONE (OUTPATIENT)
Dept: PSYCHIATRY | Facility: CLINIC | Age: 54
End: 2024-12-17

## 2024-12-17 ENCOUNTER — TELEPHONE (OUTPATIENT)
Dept: HEMATOLOGY ONCOLOGY | Facility: CLINIC | Age: 54
End: 2024-12-17

## 2024-12-17 DIAGNOSIS — Z95.828 PORT-A-CATH IN PLACE: Primary | ICD-10-CM

## 2024-12-17 DIAGNOSIS — C18.9 COLON CANCER METASTASIZED TO INTRA-ABDOMINAL LYMPH NODE (HCC): Primary | ICD-10-CM

## 2024-12-17 DIAGNOSIS — R73.03 PREDIABETES: ICD-10-CM

## 2024-12-17 DIAGNOSIS — C19 ADENOCARCINOMA OF RECTOSIGMOID JUNCTION (HCC): ICD-10-CM

## 2024-12-17 DIAGNOSIS — C77.2 COLON CANCER METASTASIZED TO INTRA-ABDOMINAL LYMPH NODE (HCC): Primary | ICD-10-CM

## 2024-12-17 LAB
EST. AVERAGE GLUCOSE BLD GHB EST-MCNC: 143 MG/DL
HBA1C MFR BLD: 6.6 %

## 2024-12-17 PROCEDURE — 83036 HEMOGLOBIN GLYCOSYLATED A1C: CPT

## 2024-12-17 PROCEDURE — 36593 DECLOT VASCULAR DEVICE: CPT

## 2024-12-17 RX ADMIN — ALTEPLASE 2 MG: 2.2 INJECTION, POWDER, LYOPHILIZED, FOR SOLUTION INTRAVENOUS at 08:39

## 2024-12-17 NOTE — PROGRESS NOTES
Patient arrives to infusion center for port maintenance with labs. Patient offers no complaints. Port accessed, no blood return noted after multiple flushes and maneuvers.     TPA instilled per protocol.  After 30 minutes port offers sporadic blood return after many flushes. Labs collected, port flushed and de-accessed. Provider contacted for possible need for port study due to x2 TPA instillations at most recent appointments.     Next appointment:  1/28/25 @ 0800

## 2024-12-17 NOTE — TELEPHONE ENCOUNTER
Called pt to move his appt on 12/20/2024 with DR. Beltran due to Dr. Beltran will not be in office till 9:00 am on that day. Patient wanted in person.  He was r/s to 1/22/2025 or if another appt opens we will move him sooner

## 2024-12-17 NOTE — TELEPHONE ENCOUNTER
"Received in basket message from infusion RN, Elvia AL, stating \"Patient has required TPA twice during his last two port flush appointments. Please order port study for patient. Port is sluggish and sporadically offers blood return only after TPA.\" Order placed for IR port check      "

## 2024-12-19 ENCOUNTER — OFFICE VISIT (OUTPATIENT)
Age: 54
End: 2024-12-19
Payer: COMMERCIAL

## 2024-12-19 VITALS
DIASTOLIC BLOOD PRESSURE: 70 MMHG | HEIGHT: 71 IN | HEART RATE: 84 BPM | RESPIRATION RATE: 16 BRPM | OXYGEN SATURATION: 97 % | BODY MASS INDEX: 30.38 KG/M2 | TEMPERATURE: 96.2 F | WEIGHT: 217 LBS | SYSTOLIC BLOOD PRESSURE: 120 MMHG

## 2024-12-19 DIAGNOSIS — E78.5 DYSLIPIDEMIA: ICD-10-CM

## 2024-12-19 DIAGNOSIS — K40.90 RIGHT INGUINAL HERNIA: ICD-10-CM

## 2024-12-19 DIAGNOSIS — G62.0 NEUROPATHY DUE TO CHEMOTHERAPEUTIC DRUG (HCC): ICD-10-CM

## 2024-12-19 DIAGNOSIS — G47.33 OBSTRUCTIVE SLEEP APNEA SYNDROME: ICD-10-CM

## 2024-12-19 DIAGNOSIS — Z76.89 ESTABLISHING CARE WITH NEW DOCTOR, ENCOUNTER FOR: ICD-10-CM

## 2024-12-19 DIAGNOSIS — E11.9 TYPE 2 DIABETES MELLITUS WITHOUT COMPLICATION, WITHOUT LONG-TERM CURRENT USE OF INSULIN (HCC): ICD-10-CM

## 2024-12-19 DIAGNOSIS — C19 ADENOCARCINOMA OF RECTOSIGMOID JUNCTION (HCC): ICD-10-CM

## 2024-12-19 DIAGNOSIS — F90.0 ATTENTION DEFICIT HYPERACTIVITY DISORDER (ADHD), PREDOMINANTLY INATTENTIVE TYPE: Primary | ICD-10-CM

## 2024-12-19 DIAGNOSIS — T45.1X5A NEUROPATHY DUE TO CHEMOTHERAPEUTIC DRUG (HCC): ICD-10-CM

## 2024-12-19 PROCEDURE — 99214 OFFICE O/P EST MOD 30 MIN: CPT | Performed by: INTERNAL MEDICINE

## 2024-12-19 RX ORDER — LISDEXAMFETAMINE DIMESYLATE 70 MG/1
70 CAPSULE ORAL EVERY MORNING
Qty: 60 CAPSULE | Refills: 0 | Status: SHIPPED | OUTPATIENT
Start: 2024-12-19 | End: 2025-02-17

## 2024-12-19 NOTE — PROGRESS NOTES
Name: Richard Rogers      : 1970      MRN: [id number]  Encounter Provider: Lisandra Levine MD  Encounter Date: 2024   Encounter department: [facility]  :  Assessment & Plan  Attention deficit hyperactivity disorder (ADHD), predominantly inattentive type    Patient on Vyvanse for several years and  Ran out of refills as she could not make office appointment send  PDMP checked, new refills given, follow-up in 4 weeks  Orders:    Albumin / creatinine urine ratio; Future    Urinalysis with microscopic; Future    CBC and differential; Future    Comprehensive metabolic panel; Future    lisdexamfetamine (VYVANSE) 70 MG capsule; Take 1 capsule (70 mg total) by mouth every morning Max Daily Amount: 70 mg    Establishing care with new doctor, encounter for    Orders:    Albumin / creatinine urine ratio; Future    Urinalysis with microscopic; Future    CBC and differential; Future    Comprehensive metabolic panel; Future    Dyslipidemia  LDL in  was elevated  Patient on Lipitor 40 mg daily, continue current medications  Repeat FLP now  Follow-up in 4 weeks  Encouraged low-fat diet and regular activity  Orders:    Albumin / creatinine urine ratio; Future    Urinalysis with microscopic; Future    CBC and differential; Future    Comprehensive metabolic panel; Future    Lipid panel; Future    Type 2 diabetes mellitus without complication, without long-term current use of insulin (HCC)    Lab Results   Component Value Date    HGBA1C 6.6 (H) 2024   Controlled  Continue metformin 750 daily  Discussed lifestyle modification carb controlled diet    Orders:    Albumin / creatinine urine ratio; Future    Urinalysis with microscopic; Future    CBC and differential; Future    Comprehensive metabolic panel; Future    Obstructive sleep apnea syndrome  Compliant with CPAP and doing well  Continue to monitor  Orders:    Albumin / creatinine urine ratio; Future    Urinalysis with microscopic;  Future    CBC and differential; Future    Comprehensive metabolic panel; Future    Adenocarcinoma of rectosigmoid junction (HCC)  S/p chemotherapy  Follows up with [facility]  Under active surveillance.  Continue to monitor  Orders:    Albumin / creatinine urine ratio; Future    Urinalysis with microscopic; Future    CBC and differential; Future    Comprehensive metabolic panel; Future    Neuropathy due to chemotherapeutic drug (HCC)  Patient continues to have residual symptoms  On pregabalin 75 mg twice daily and tolerating well  Consider increase in medication in next visit    Orders:    Albumin / creatinine urine ratio; Future    Urinalysis with microscopic; Future    CBC and differential; Future    Comprehensive metabolic panel; Future    Right inguinal hernia  History of previous right inguinal hernia raphe  Evidence of fat filled hernia on recent CT abdomen  Patient without any significant symptoms  Will refer to general surgery for further evaluation  Orders:    Ambulatory Referral to General Surgery; Future           History of Present Illness     Patient comes for follow-up of multiple chronic medical conditions as noted above, he needs to establish care with a new office.  Notes no significant complaints except as noted in ROS  HPI  Review of Systems   Constitutional:  Negative for activity change, appetite change, chills, diaphoresis, fatigue, fever and unexpected weight change.   HENT:  Negative for congestion and sore throat.    Respiratory:  Negative for apnea, cough, choking, chest tightness, shortness of breath, wheezing and stridor.    Cardiovascular:  Negative for chest pain, palpitations and leg swelling.   Gastrointestinal:  Negative for abdominal distention, abdominal pain, blood in stool, constipation, nausea and rectal pain.   Genitourinary:  Negative for dysuria, flank pain, frequency and urgency.   Musculoskeletal:  Negative for arthralgias, back pain, gait problem, joint swelling  and myalgias.   Skin:  Negative for color change, pallor and rash.   Neurological:  Negative for headaches.         Past Medical History   Past Medical History:   Diagnosis Date    Depression     Sleep difficulties      Past Surgical History:   Procedure Laterality Date    BACK SURGERY      COLON SURGERY      HERNIA REPAIR       History reviewed. No pertinent family history.   reports that he has never smoked. He has never used smokeless tobacco. He reports that he does not drink alcohol and does not use drugs.  Current Outpatient Medications on File Prior to Visit   Medication Sig Dispense Refill    Ascorbic Acid (vitamin C) 1000 MG tablet Take 1,000 mg by mouth daily      atorvastatin (LIPITOR) 40 mg tablet Take 1 tablet (40 mg total) by mouth daily 90 tablet 1    DULoxetine (Cymbalta) 30 mg delayed release capsule Take 1 capsule (30 mg total) by mouth daily at bedtime 90 capsule 1    escitalopram (LEXAPRO) 20 mg tablet Take 1 tablet (20 mg total) by mouth daily 90 tablet 0    fluticasone (FLONASE) 50 mcg/act nasal spray instill 1 sprays into each nostril twice a day 48 mL 1    metFORMIN (GLUCOPHAGE-XR) 750 mg 24 hr tablet take 1 tablet by mouth once daily with breakfast 90 tablet 1    montelukast (SINGULAIR) 10 mg tablet Take 1 tablet (10 mg total) by mouth daily at bedtime 90 tablet 1    Omega-3 Fatty Acids (Fish Oil) 1200 MG CAPS Take 1,200 mg by mouth in the morning      omeprazole (PriLOSEC) 20 mg delayed release capsule Take 20 mg by mouth daily      pramipexole (MIRAPEX) 0.5 mg tablet take 1 tablet by mouth at bedtime 90 tablet 1    pregabalin (LYRICA) 75 mg capsule TAKE 1 CAPSULE BY MOUTH TWICE A DAY 60 capsule 0    simethicone (MYLICON,GAS-X) 180 MG capsule Take 1 capsule (180 mg total) by mouth every 6 (six) hours as needed for flatulence 120 capsule 0    vardenafil (LEVITRA) 20 MG tablet Take 1 tablet (20 mg total) by mouth daily as needed for erectile dysfunction 10 tablet 5    zinc gluconate 50 mg  "tablet Take 50 mg by mouth daily      [DISCONTINUED] cephalexin (KEFLEX) 500 mg capsule take 1 capsule by mouth every 8 hours for 7 days (Patient not taking: Reported on 12/19/2024) 21 capsule 0    [DISCONTINUED] HYDROcodone-acetaminophen (NORCO) 5-325 mg per tablet Take 1 tablet by mouth every 6 (six) hours as needed for pain for up to 12 doses Max Daily Amount: 4 tablets (Patient not taking: Reported on 12/19/2024) 12 tablet 0    [DISCONTINUED] lisdexamfetamine (VYVANSE) 70 MG capsule Take 1 capsule (70 mg total) by mouth every morning (Patient not taking: Reported on 12/19/2024) 30 capsule 0     Current Facility-Administered Medications on File Prior to Visit   Medication Dose Route Frequency Provider Last Rate Last Admin    alteplase (CATHFLO) injection 2 mg  2 mg Intracatheter Q1MIN PRN Tamica Zena Pollack, DO   2 mg at 12/17/24 0839   No Known Allergies   Objective   /70 (BP Location: Left arm, Patient Position: Sitting, Cuff Size: Large)   Pulse 84   Temp (!) 96.2 °F (35.7 °C) (Tympanic)   Resp 16   Ht 5' 11\" (1.803 m)   Wt 98.4 kg (217 lb)   SpO2 97%   BMI 30.27 kg/m²      Physical Exam  Constitutional:       General: He is not in acute distress.     Appearance: Normal appearance. He is normal weight. He is not ill-appearing, toxic-appearing or diaphoretic.   Cardiovascular:      Rate and Rhythm: Normal rate and regular rhythm.      Pulses: Normal pulses.      Heart sounds: Normal heart sounds. No murmur heard.     No gallop.   Pulmonary:      Effort: Pulmonary effort is normal. No respiratory distress.      Breath sounds: Normal breath sounds. No stridor. No wheezing, rhonchi or rales.   Chest:      Chest wall: No tenderness.   Abdominal:      Palpations: Abdomen is soft.   Musculoskeletal:      Right lower leg: No edema.      Left lower leg: No edema.   Neurological:      Mental Status: He is alert and oriented to person, place, and time.         "

## 2024-12-19 NOTE — ASSESSMENT & PLAN NOTE
S/p chemotherapy  Follows up with [facility]'s oncology  Under active surveillance.  Continue to monitor  Orders:    Albumin / creatinine urine ratio; Future    Urinalysis with microscopic; Future    CBC and differential; Future    Comprehensive metabolic panel; Future

## 2024-12-19 NOTE — ASSESSMENT & PLAN NOTE
Compliant with CPAP and doing well  Continue to monitor  Orders:    Albumin / creatinine urine ratio; Future    Urinalysis with microscopic; Future    CBC and differential; Future    Comprehensive metabolic panel; Future

## 2024-12-19 NOTE — PATIENT INSTRUCTIONS
"Patient Education     Carb counting for adults with diabetes   The Basics   Written by the doctors and editors at [facility]   What is carb counting? -- This is a type of meal planning that many people with diabetes use. It is a way to figure out how many carbohydrates, or \"carbs,\" you eat.  The body breaks down the food we eat into 3 main types of nutrients: carbs, proteins, and fats. Carbs are sugars and starches that come from food. The body uses carbs for energy.  Why do I need to count carbs? -- People with diabetes need to pay attention to how many carbs they eat. This is because carbs raise your blood sugar level.  Carb counting helps you:   Choose the right amount of insulin to take before meals and snacks - If you take insulin before meals, the dose depends on several things, including how many carbs you plan to eat. (It also depends on how much you plan to exercise and your blood sugar level.)   Plan your meals and snacks for the day - You can use carb counting to figure out how many carbs to eat at each meal and snack. This helps you make sure that you eat the right amount over the entire day.   Keep your blood sugar levels well managed - Spreading out the carbs you eat over a whole day can help keep your blood sugar from getting too high. If you take insulin or another diabetes medicine that can cause low blood sugar, eating about the same amount of carbs at each meal every day also helps keep your blood sugar from getting too low. Reducing the amount of carbs you eat can help you manage your diabetes better and prevent medical problems that diabetes can cause.  Your doctor, nurse, or dietitian (food expert) can help you figure out how many carbs to try to eat each day. This will depend on your eating habits, weight, activity level, and which diabetes medicines you take.  People who take insulin before meals might need to be very careful when they count the carbs in every meal and snack. This is so they " "can give themselves the right amount of insulin. If the insulin dose doesn't match the amount of carbs, their blood sugar might get too low or too high. Other people might be able to be a little more flexible as long as they get about the same amount of carbs at each meal or throughout the day.  Which foods have carbs? -- Foods with a lot of carbs include:   Grains - These include bread, pasta, rice, and cereal.   Fruits and starchy vegetables - Starchy vegetables include potatoes, corn, and squash.   Milk and other dairy products - Dairy products include cheese and yogurt.   Foods with added sugar - These include sweets and baked goods likes cookies and cakes, as well as sugary drinks like juice and soda.  It is best to get most of your carbs from fruits, vegetables, whole grains (like whole-wheat bread, whole-grain cereals, and brown rice), and low-fat milk and dairy products.  How do I count carbs? -- To count carbs in packaged foods, check the food's nutrition label (if it has one).  On the label (figure 1), check for:   \"Total Carbohydrate\" number - This tells you how many carbs are in 1 serving size of the food. If you eat 1 serving, then the number of carbs you eat is the same as the number of total carbohydrates.   \"Serving size\" - This tells you how much food is in 1 serving. If you have 2 servings, the number of carbs will be 2 times the number of carbohydrates listed.   \"Dietary Fiber\" - Fiber is a carb that is not digested, which means that it does not raise blood sugar. Foods with a lot of fiber can help manage your blood sugar. If a food has more than 5 grams (g) of fiber, you need less insulin to cover the total carbs in that food. So, if you are calculating an insulin dose, only count the carbs that are not from fiber (figure 1).  What is exchange planning? -- Exchange planning, or the \"exchange system,\" is a way for people to plan their meals without reading labels. This can be helpful since many " "foods don't come with a nutrition label.  The exchange system involves knowing how much of different foods have about 15 grams of carbs (table 1 and table 2 and table 3). Your doctor, nurse, or dietitian gives you a certain number of \"carb choices\" to eat with each meal and snack (table 4). Each \"choice\" is a portion of food that has about 15 grams of carbs. Knowing your options makes it easier to \"exchange\" 1 carb choice for another as you plan your meals and snacks. For example, 1 small apple could be exchanged for 1/3 cup of pasta.  How can I plan my meals? -- First, make sure that you know how many carbs you should be eating each day. Ask your doctor, nurse, or dietitian if you are not sure.  Here are some tips that might help:   Spread out your carbs over 4 to 6 small meals each day instead of 3 big ones.   Eat a similar number of carbs at each meal, for example, at each dinner.   Eat your meals at a similar time each day.   Plan your meals ahead of time.   Use the \"plate method.\" This is a simpler way to make sure that you get a good balance of carbs and other nutrients with each meal. It is not as exact as counting all of your carbs, but it can be helpful for people who prefer a simpler approach. If you take insulin before meals, it is generally better to adjust your insulin dose by counting how many carbs you plan to eat or using the exchange planning strategy.  For the plate method, you start with a plate about 9 inches (23 cm) across. Fill it with (figure 2):   1/2 non-starchy vegetables   1/4 protein   1/4 carbs   Follow your doctor's instructions for how and when to check your blood sugar. This can help you learn how certain foods affect your blood sugar.   Keep track of your meals and blood sugar levels. Show this to your doctor or nurse so they can adjust your treatment if needed. If you take insulin, you will also need to keep track of your exercise patterns and how much insulin you give yourself with " "each dose.   If you take insulin, make sure that you understand how to use it. This includes knowing how to adjust the dose based on your blood sugar level and what you plan to eat. Foods that have a lot of protein or fat also can affect your blood sugar level. Some people need to adjust their insulin doses when they eat these foods.   Remember that other things besides carbs can raise or lower your blood sugar level. These things can include exercise, getting sick, drinking alcohol, traveling, and stress. If you take insulin, make sure that you know how and when to adjust your dose in these situations.  If you are having trouble counting carbs or managing your blood sugar, talk to your doctor or nurse. They can help. A dietitian can also help you plan specific menus that will give you the right amount of carbs each day.  For more information, you can also get a book on counting carbs or check the American Diabetes Association website (www.diabetes.org).  All topics are updated as new evidence becomes available and our peer review process is complete.  This topic retrieved from [employer] on: Mar 27, 2024.  Topic 40541 Version 11.0  Release: 32.2.4 - C32.85  © 2024 Up[employer]Date, Inc. and/or its affiliates. All rights reserved.  figure 1: Counting carbohydrates     To figure out the \"carb count\" in 1 serving, start with the number of grams of total carbohydrates (46 grams), then subtract the number of grams of dietary fiber (7 grams). It's also important to look at the serving size. In this example, the carb count is 39 grams. You can use this number when counting carbs for your insulin dose.  Graphic 07904 Version 8.0  table 1: Bread and grains with 15 grams of carbs*  Bread    Food  Serving size    Bagel 1/4 large bagel (1 oz)   Biscuit 1 biscuit (2.5 inches across)   Bread, reduced calorie, light 2 slices (1.5 oz)   Cornbread 1.75 inch cube (1.5 oz)   English muffin 1/2 muffin   Hot dog or hamburger bun 1/2 bun (3/4 oz) " "  Naan, chapati, or roti 1 oz   Pancake 1 pancake (4 inches across, 1/4 inch thick)   Agnes (6 inches across) 1/2 agnes   Tortilla, corn 1 small tortilla (6 inches across)   Tortilla, flour (white or whole wheat) 1 small tortilla (6 inches across) or 1/3 large tortilla (10 inches across)   Waffle 1 waffle (4-inch square or 4 inches across)   Cereals and grains (including pasta and rice)    Food  Serving size (cooked)    Barley, couscous, millet, pasta (white or whole wheat, all shapes and sizes), polenta, quinoa (all colors), or rice (white, brown, and other colors and types) 1/3 cup   Bran cereal (twigs, buds, or flakes), shredded wheat (plain), or sugar-coated cereal 1/2 cup   Bulgur, kasha, tabbouleh (tabouli), or wild rice 1/2 cup   Granola cereal 1/4 cup   Hot cereal (oats, oatmeal, grits) 1/2 cup   Unsweetened, ready-to-eat cereal 3/4 cup   * For bread and grains, 15 grams of carbs is considered 1 serving or \"choice\" for people who need to count carbs.  Graphic 318850 Version 1.0  table 2: Fruits with 15 grams of carbs*  Food  Serving size    Applesauce, unsweetened 1/2 cup   Banana 1 extra small banana, about 4 inches long (4 oz)   Blueberries 3/4 cup   Dried fruits (blueberries, cherries, cranberries, mixed fruit, raisins) 2 tbsp   Fruit, canned 1/2 cup   Fruit, whole, small (apple) 1 small fruit (4 oz)   Fruit, whole, medium (nectarine, orange, pear, tangerine) 1 medium fruit (6 oz)   Fruit juice, unsweetened 1/2 cup   Grapes 17 small grapes (3 oz)   Melon, diced 1 cup   Strawberries, whole 1 and 1/4 cups   When listed, weight (oz) includes skin and seeds. If you are not sure if your fruit is the right size for 1 serving, you can use a food scale to check the weight.  * For fruits, 15 grams of carbs is considered 1 serving or \"choice\" for people who need to count carbs.  Graphic [id number] Version 1.0  table 3: Starchy vegetables with 15 grams of carbs*  Food  Serving size (cooked)    Cassava, dasheen, or " "plantain 1/3 cup   Corn, green peas, mixed vegetables, or parsnips 1/2 cup   Marinara, pasta, or spaghetti sauce 1/2 cup   Mixed vegetables (with corn or peas) 1 cup   Potato, baked with skin 1/4 large (3 oz)   Potato, Kinyarwanda-fried (oven-baked) 1 cup (2 oz)   Potato, mashed with milk and fat 1/2 cup   Squash, winter (acorn, butternut) 1 cup   Yam or sweet potato, plain 1/2 cup (3 and 1/2 oz)   If you are not sure if your vegetable is the right size for 1 serving, you can use a food scale to check the weight.  * For starchy vegetables, 15 grams of carbs is considered 1 serving or \"choice\" for people who need to count carbs.  Graphic [id number] Version 1.0  table 4: Sample exchange system meal plan  Time  Exchange pattern  Sample menu  Carbohydrate count (g)    8 am 3 carbohydrate group    2 starch 1 English muffin 30    1 fruit 1 1/4 c strawberries 15    1 protein group 1/4 c cottage cheese -    1 fat group 1 tsp margarine -      Total: 45    12 noon 4 carbohydrate group    2 starch 2 slices of bread 30    1 fruit 1 orange 15    1 vegetable 1 c salad -    1 milk 8 oz skim milk 12    3 protein group 3 oz chicken -    1 fat group 1 tbsp low fat christopher -      Total: 57    3 pm 1 carbohydrate group    1 fruit or 1 starch 1 apple or 6 crackers 15      Total: 15    6 pm 4 carbohydrate group    2 starch 1 c potato 30    1 fruit 1/2 c fruit salad 15    1 vegetable 1 c salad -    1 milk 8 oz skim milk 12    6 protein group 6 oz fish -    1 fat group 2 tbsp low fat salad dressing -      Total: 57    9 pm 1 carbohydrate group    1 starch 6 crackers 15    1 protein 2 tbsp peanut butter -      Total: 15    Graphic 05493 Version 3.0  figure 2: The \"plate method\"     For the plate method, you start with a plate about 9 inches (23 cm) across. Then fill it with 1/2 non-starchy vegetables, 1/4 protein, and 1/4 carbs.  Graphic 563130 Version 2.0  Consumer Information Use and Disclaimer   Disclaimer: This generalized information is a limited " summary of diagnosis, treatment, and/or medication information. It is not meant to be comprehensive and should be used as a tool to help the user understand and/or assess potential diagnostic and treatment options. It does NOT include all information about conditions, treatments, medications, side effects, or risks that may apply to a specific patient. It is not intended to be medical advice or a substitute for the medical advice, diagnosis, or treatment of a health care provider based on the health care provider's examination and assessment of a patient's specific and unique circumstances. Patients must speak with a health care provider for complete information about their health, medical questions, and treatment options, including any risks or benefits regarding use of medications. This information does not endorse any treatments or medications as safe, effective, or approved for treating a specific patient. Up[employer]Date, Inc. and its affiliates disclaim any warranty or liability relating to this information or the use thereof.The use of this information is governed by the Terms of Use, available at https://www.wolterskluwer.com/en/know/clinical-effectiveness-terms. 2024© UpToDate, Inc. and its affiliates and/or licensors. All rights reserved.  Copyright   © 2024 Up[employer]Date, Inc. and/or its affiliates. All rights reserved.

## 2024-12-19 NOTE — ASSESSMENT & PLAN NOTE
Patient continues to have residual symptoms  On pregabalin 75 mg twice daily and tolerating well  Consider increase in medication in next visit    Orders:    Albumin / creatinine urine ratio; Future    Urinalysis with microscopic; Future    CBC and differential; Future    Comprehensive metabolic panel; Future

## 2024-12-19 NOTE — ASSESSMENT & PLAN NOTE
Patient on Vyvanse for several years and  Ran out of refills as she could not make office appointment send  PDMP checked, new refills given, follow-up in 4 weeks  Orders:    Albumin / creatinine urine ratio; Future    Urinalysis with microscopic; Future    CBC and differential; Future    Comprehensive metabolic panel; Future    lisdexamfetamine (VYVANSE) 70 MG capsule; Take 1 capsule (70 mg total) by mouth every morning Max Daily Amount: 70 mg

## 2024-12-21 ENCOUNTER — APPOINTMENT (OUTPATIENT)
Dept: LAB | Facility: HOSPITAL | Age: 54
End: 2024-12-21

## 2024-12-21 DIAGNOSIS — Z00.6 ENCOUNTER FOR EXAMINATION FOR NORMAL COMPARISON OR CONTROL IN CLINICAL RESEARCH PROGRAM: ICD-10-CM

## 2024-12-21 PROCEDURE — 36415 COLL VENOUS BLD VENIPUNCTURE: CPT

## 2024-12-31 ENCOUNTER — HOSPITAL ENCOUNTER (OUTPATIENT)
Dept: NON INVASIVE DIAGNOSTICS | Facility: HOSPITAL | Age: 54
Discharge: HOME | End: 2024-12-31
Attending: INTERNAL MEDICINE
Payer: COMMERCIAL

## 2024-12-31 DIAGNOSIS — C18.9 COLON CANCER METASTASIZED TO INTRA-ABDOMINAL LYMPH NODE (HCC): ICD-10-CM

## 2024-12-31 DIAGNOSIS — C77.2 COLON CANCER METASTASIZED TO INTRA-ABDOMINAL LYMPH NODE (HCC): ICD-10-CM

## 2024-12-31 PROCEDURE — 36598 INJ W/FLUOR EVAL CV DEVICE: CPT

## 2024-12-31 PROCEDURE — 36598 INJ W/FLUOR EVAL CV DEVICE: CPT | Performed by: RADIOLOGY

## 2024-12-31 NOTE — SEDATION DOCUMENTATION
PORT ACCESSED, BLOOD RETURN NOTED AND FLUSHED EASILY WHEN PT LYING DOWN  CONTRAST INJECTED AND IMAGES TAKEN FOR DR. BOWLING TO REVIEW

## 2025-01-06 LAB
APOB+LDLR+PCSK9 GENE MUT ANL BLD/T: NOT DETECTED
BRCA1+BRCA2 DEL+DUP + FULL MUT ANL BLD/T: NOT DETECTED
MLH1+MSH2+MSH6+PMS2 GN DEL+DUP+FUL M: NOT DETECTED

## 2025-01-22 ENCOUNTER — OFFICE VISIT (OUTPATIENT)
Dept: PSYCHIATRY | Facility: CLINIC | Age: 55
End: 2025-01-22
Payer: COMMERCIAL

## 2025-01-22 DIAGNOSIS — F33.1 MAJOR DEPRESSIVE DISORDER, RECURRENT, MODERATE (HCC): Primary | ICD-10-CM

## 2025-01-22 DIAGNOSIS — F41.1 GENERALIZED ANXIETY DISORDER: ICD-10-CM

## 2025-01-22 DIAGNOSIS — F90.0 ATTENTION DEFICIT HYPERACTIVITY DISORDER (ADHD), PREDOMINANTLY INATTENTIVE TYPE: ICD-10-CM

## 2025-01-22 PROCEDURE — 90792 PSYCH DIAG EVAL W/MED SRVCS: CPT | Performed by: STUDENT IN AN ORGANIZED HEALTH CARE EDUCATION/TRAINING PROGRAM

## 2025-01-22 RX ORDER — BUPROPION HYDROCHLORIDE 150 MG/1
150 TABLET ORAL DAILY
Qty: 30 TABLET | Refills: 1 | Status: SHIPPED | OUTPATIENT
Start: 2025-01-22

## 2025-01-22 RX ORDER — ZINC PICOLINATE
1 POWDER (GRAM) MISCELLANEOUS DAILY
COMMUNITY

## 2025-01-22 RX ORDER — LORATADINE 10 MG/1
10 TABLET ORAL DAILY
COMMUNITY

## 2025-01-22 RX ORDER — TETRACYCLINE HCL 500 MG
1 CAPSULE ORAL DAILY
COMMUNITY

## 2025-01-22 NOTE — ASSESSMENT & PLAN NOTE
The patient has a history of ADHD and a previous diagnosis of this.  He has had good benefit on current medication regimen of Vyvanse.  He does continue to have residual issues with concentration, though suspect this may be related to ongoing depressive symptoms.    See plan for major depressive disorder.
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
## 2025-01-22 NOTE — BH TREATMENT PLAN
TREATMENT PLAN (Medication Management Only)        [facility]    Name and Date of Birth:  Richard Rogers 54 y.o. 1970  Date of Treatment Plan: January 22, 2025  Diagnosis/Diagnoses:    1. Major depressive disorder, recurrent, moderate (HCC)    2. Attention deficit hyperactivity disorder (ADHD), predominantly inattentive type    3. Generalized anxiety disorder      Strengths/Personal Resources for Self-Care: supportive family, taking medications as prescribed, ability to adapt to life changes, ability to communicate well, ability to listen, ability to reason, ability to understand psychiatric illness, independence, motivation for treatment, being resoureceful, stable employment, willingness to work on problems, work skills.  Area/Areas of need (in own words): anxiety symptoms, depressive symptoms, ADHD symptoms  1. Long Term Goal: continue improvement in depression.  Target Date:6 months - 7/22/2025  Person/Persons responsible for completion of goal: Richard  2.  Short Term Objective (s) - How will we reach this goal?:   A. Provider new recommended medication/dosage changes and/or continue medication(s):  Will switch Cymbalta to Wellbutrin to augment effects of Lexapro. Will continue Lexapro and Vyvanse .  B.  Will start therapy with first appointment pending .  C.  Will work on lifestyle modifications to help improve stressors .  Target Date:6 months - 7/22/2025  Person/Persons Responsible for Completion of Goal: Richard  Progress Towards Goals: starting treatment  Treatment Modality: medication management every 4 weeks  Review due 180 days from date of this plan: 6 months - 7/22/2025  Expected length of service: ongoing treatment  My Physician/PA/NP and I have developed this plan together and I agree to work on the goals and objectives. I understand the treatment goals that were developed for my treatment.

## 2025-01-22 NOTE — BH CRISIS PLAN
Client Name: Richard Rogers       Client YOB: 1970    [facility] Safety Plan      Creation Date: 1/22/25 Update Date: 1/22/25   Created By: Gilberto Beltran MD Last Updated By: Gilberto Beltran MD      Step 1: Warning Signs:   Warning Signs   Suicidal thoughts   Withdrawing from others            Step 2: Internal Coping Strategies:   Internal Coping Strategies   Playing games on phone   Watching television            Step 3: People and social settings that provide distraction:   Name Contact Information   Cuca (holly) In home, phone            Step 4: People whom I can ask for help during a crisis:      Name Contact Information    Cuca (raulcaryndanish) In home, phone      Step 5: Professionals or agencies I can contact during a crisis:      Clinican/Agency Name Phone Emergency Contact    [facility] [phone]       [facility] Emergency Department Emergency Department Phone Emergency Department Address    [facility] [phone]     [facility] [phone]         Crisis Phone Numbers:   Suicide Prevention Lifeline: Call or Text  068 Crisis Text Line: Text HOME to 434-436   Please note: Some [facility] do not have a separate number for Child/Adolescent specific crisis. If your county is not listed under Child/Adolescent, please call the adult number for your county      Adult Crisis Numbers: Child/Adolescent Crisis Numbers   [facility]: [phone] [facility]: [phone]   [facility]: [phone] [facility]: [phone]   [facility]: [phone] [place]: [phone]   [facility]: [phone] [place]: [phone]   [place] [facility]: [phone]   [facility]: [phone]   [facility]: [phone]   [facility]: [phone] (daytime) [phone] (after hours, weekends, holidays)      Step 6: Making the environment safer (plan for lethal means safety):   Patient did not  identify any lethal methods: Yes     Optional: What is most important to me and worth living for?   Sonholly     Jason Safety Plan. Kasia Velázquez and Drew Levine. Used with permission of the authors.

## 2025-01-22 NOTE — ASSESSMENT & PLAN NOTE
The patient does meet criteria for major depressive disorder.  Current symptoms are rated as moderate.  He does have recurrent passive death wishes but adamantly denies any active suicidal thoughts, plan, or intention.  Patient has had some benefit on current medication regimen, with good historic benefit on Lexapro.  Cymbalta was more recently added as an augmentation agent.  He does struggle with erectile dysfunction and low libido.  He also continues to have residual depressive symptoms.  Patient is able to plan for safety and as above denies any active SI, HI, or AVH.  He does not demonstrate an imminent danger to his safety or to the safety of others at this time.  Patient agreeable to go to the emergency department if symptoms were to worsen.  Discussed plan to switch Cymbalta to Wellbutrin to augment effects of Lexapro for management of depression and neurovegetative symptoms with hopeful reduction in his low libido.  Patient agreeable to this plan.    Continue Lexapro 20 mg daily.  Discontinue Cymbalta.  Start Wellbutrin  mg daily.  Continue Vyvanse 70 mg daily.    Patient has upcoming psychotherapy appointment.

## 2025-01-22 NOTE — ASSESSMENT & PLAN NOTE
The patient is criteria for generalized anxiety disorder.  Symptoms appear to be severe.  We will attempt to control symptoms of major depressive disorder as these appear to be more significantly affecting his daily functioning and his immediate concerns.  We will continue to monitor anxiety symptoms for improvement.  Patient does have upcoming start to individual therapy.    See plan for major depressive disorder.

## 2025-01-26 ENCOUNTER — HOSPITAL ENCOUNTER (EMERGENCY)
Facility: HOSPITAL | Age: 55
Discharge: HOME | End: 2025-01-26
Attending: EMERGENCY MEDICINE | Admitting: EMERGENCY MEDICINE
Payer: COMMERCIAL

## 2025-01-26 ENCOUNTER — APPOINTMENT (EMERGENCY)
Dept: RADIOLOGY | Facility: HOSPITAL | Age: 55
End: 2025-01-26
Payer: COMMERCIAL

## 2025-01-26 ENCOUNTER — APPOINTMENT (EMERGENCY)
Dept: CT IMAGING | Facility: HOSPITAL | Age: 55
End: 2025-01-26
Payer: COMMERCIAL

## 2025-01-26 VITALS
BODY MASS INDEX: 28.4 KG/M2 | TEMPERATURE: 98.1 F | OXYGEN SATURATION: 95 % | SYSTOLIC BLOOD PRESSURE: 132 MMHG | WEIGHT: 202.82 LBS | HEIGHT: 71 IN | RESPIRATION RATE: 16 BRPM | HEART RATE: 94 BPM | DIASTOLIC BLOOD PRESSURE: 73 MMHG

## 2025-01-26 DIAGNOSIS — S39.012A BACK STRAIN, INITIAL ENCOUNTER: ICD-10-CM

## 2025-01-26 DIAGNOSIS — S16.1XXA CERVICAL STRAIN: ICD-10-CM

## 2025-01-26 DIAGNOSIS — S46.912A STRAIN OF LEFT SHOULDER, INITIAL ENCOUNTER: ICD-10-CM
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
## 2025-01-26 PROCEDURE — 70450 CT HEAD/BRAIN W/O DYE: CPT

## 2025-01-26 PROCEDURE — 99285 EMERGENCY DEPT VISIT HI MDM: CPT | Performed by: EMERGENCY MEDICINE

## 2025-01-26 PROCEDURE — 72125 CT NECK SPINE W/O DYE: CPT

## 2025-01-26 PROCEDURE — 73030 X-RAY EXAM OF SHOULDER: CPT

## 2025-01-26 PROCEDURE — 99284 EMERGENCY DEPT VISIT MOD MDM: CPT

## 2025-01-26 RX ORDER — METHOCARBAMOL 500 MG/1
500 TABLET, FILM COATED ORAL 2 TIMES DAILY
Qty: 10 TABLET | Refills: 0 | Status: SHIPPED | OUTPATIENT
Start: 2025-01-26
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
## 2025-01-28 ENCOUNTER — HOSPITAL ENCOUNTER (OUTPATIENT)
Dept: INFUSION CENTER | Facility: CLINIC | Age: 55
Discharge: HOME | End: 2025-01-28
Payer: COMMERCIAL

## 2025-01-28 DIAGNOSIS — C19 ADENOCARCINOMA OF RECTOSIGMOID JUNCTION (HCC): ICD-10-CM

## 2025-01-28 DIAGNOSIS — Z95.828 PORT-A-CATH IN PLACE: Primary | ICD-10-CM

## 2025-01-28 PROCEDURE — 96523 IRRIG DRUG DELIVERY DEVICE: CPT

## 2025-01-28 NOTE — PROGRESS NOTES
Pt presents for port a cath flush offering no complaints. Port a cath accessed, flushed, and positive blood return noted. Port a cath de accessed without difficulty. Pt declined AVS. Next appointment on 3/25/25 at 8:30am.

## 2025-02-26 ENCOUNTER — OFFICE VISIT (OUTPATIENT)
Dept: PSYCHIATRY | Facility: CLINIC | Age: 55
End: 2025-02-26
Payer: COMMERCIAL

## 2025-02-26 DIAGNOSIS — F90.0 ATTENTION DEFICIT HYPERACTIVITY DISORDER (ADHD), PREDOMINANTLY INATTENTIVE TYPE: ICD-10-CM

## 2025-02-26 DIAGNOSIS — F33.1 MAJOR DEPRESSIVE DISORDER, RECURRENT, MODERATE (HCC): Primary | ICD-10-CM

## 2025-02-26 PROCEDURE — 99213 OFFICE O/P EST LOW 20 MIN: CPT | Performed by: STUDENT IN AN ORGANIZED HEALTH CARE EDUCATION/TRAINING PROGRAM

## 2025-02-26 RX ORDER — LISDEXAMFETAMINE DIMESYLATE 70 MG/1
70 CAPSULE ORAL EVERY MORNING
Qty: 30 CAPSULE | Refills: 0 | Status: SHIPPED | OUTPATIENT
Start: 2025-02-26 | End: 2025-04-27

## 2025-02-26 RX ORDER — ESCITALOPRAM OXALATE 20 MG/1
20 TABLET ORAL DAILY
Qty: 90 TABLET | Refills: 0 | Status: SHIPPED | OUTPATIENT
Start: 2025-02-26

## 2025-02-26 RX ORDER — BUPROPION HYDROCHLORIDE 150 MG/1
150 TABLET ORAL DAILY
Qty: 90 TABLET | Refills: 0 | Status: SHIPPED | OUTPATIENT
Start: 2025-02-26

## 2025-02-26 NOTE — ASSESSMENT & PLAN NOTE
Symptoms currently appear to be stable and well-controlled.  The patient has been tolerating initiation of Wellbutrin fairly well.    Continue Lexapro 20 mg daily.  Continue Wellbutrin  mg daily.    Patient scheduled to start individual therapy on 3/10.

## 2025-02-26 NOTE — ASSESSMENT & PLAN NOTE
Symptoms appear to be fairly well-controlled on current regimen.  He has been functional at work and has been able to stay on top of tasks at home.    Continue Vyvanse 70 mg daily.
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
## 2025-03-10 ENCOUNTER — OFFICE VISIT (OUTPATIENT)
Dept: BEHAVIORAL/MENTAL HEALTH CLINIC | Facility: CLINIC | Age: 55
End: 2025-03-10
Payer: COMMERCIAL

## 2025-03-10 DIAGNOSIS — F33.1 MAJOR DEPRESSIVE DISORDER, RECURRENT, MODERATE (HCC): Primary | ICD-10-CM

## 2025-03-10 DIAGNOSIS — F41.1 GENERALIZED ANXIETY DISORDER: ICD-10-CM

## 2025-03-10 PROCEDURE — 90791 PSYCH DIAGNOSTIC EVALUATION: CPT | Performed by: COUNSELOR
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
## 2025-03-25 ENCOUNTER — HOSPITAL ENCOUNTER (OUTPATIENT)
Dept: INFUSION CENTER | Facility: CLINIC | Age: 55
Discharge: HOME | End: 2025-03-25

## 2025-03-25 DIAGNOSIS — C19 ADENOCARCINOMA OF RECTOSIGMOID JUNCTION (HCC): ICD-10-CM

## 2025-03-25 DIAGNOSIS — Z95.828 PORT-A-CATH IN PLACE: Primary | ICD-10-CM

## 2025-03-25 NOTE — PROGRESS NOTES
Pt presents for port a cath flush offering no complaints. Port a cath accessed, flushed, and positive blood return noted. Port a cath de accessed without difficulty. Pt declined AVS. Next appointment on 5/13/25 at 9am.

## 2025-04-01 ENCOUNTER — PATIENT MESSAGE (OUTPATIENT)
Dept: SLEEP CENTER | Facility: CLINIC | Age: 55
End: 2025-04-01

## 2025-04-02 ENCOUNTER — TELEPHONE (OUTPATIENT)
Dept: SLEEP CENTER | Facility: CLINIC | Age: 55
End: 2025-04-02

## 2025-04-02 DIAGNOSIS — G47.33 OBSTRUCTIVE SLEEP APNEA SYNDROME: Primary | ICD-10-CM

## 2025-04-02 NOTE — TELEPHONE ENCOUNTER
Patient sent [facility] message asking for resupply Rx to be sent to [facility].  He has new insurance as of January 2025 and is no longer required to use [facility].      Nal[facility], would you be willing to write new resupply Rx?  His last Rx was for nasal pillows and he switched to [facility] in the meantime.     Last office visit 7/22/2024.

## 2025-04-03 NOTE — TELEPHONE ENCOUNTER
Rx for PAP resupply sent to [facility] via [employer] [facility].    Patient notified via [employer].

## 2025-04-21 DIAGNOSIS — J30.1 NON-SEASONAL ALLERGIC RHINITIS DUE TO POLLEN: ICD-10-CM

## 2025-04-21 RX ORDER — FLUTICASONE PROPIONATE 50 MCG
1 SPRAY, SUSPENSION (ML) NASAL 2 TIMES DAILY
Qty: 16 ML | Refills: 5 | Status: SHIPPED | OUTPATIENT
Start: 2025-04-21

## 2025-04-25 ENCOUNTER — APPOINTMENT (OUTPATIENT)
Age: 55
End: 2025-04-25

## 2025-05-03 ENCOUNTER — HOSPITAL ENCOUNTER (EMERGENCY)
Facility: HOSPITAL | Age: 55
Discharge: HOME | End: 2025-05-03
Attending: EMERGENCY MEDICINE
Payer: COMMERCIAL

## 2025-05-03 ENCOUNTER — TELEPHONE (OUTPATIENT)
Dept: OTHER | Facility: OTHER | Age: 55
End: 2025-05-03

## 2025-05-03 VITALS
HEART RATE: 67 BPM | RESPIRATION RATE: 18 BRPM | SYSTOLIC BLOOD PRESSURE: 125 MMHG | OXYGEN SATURATION: 96 % | TEMPERATURE: 97.9 F | DIASTOLIC BLOOD PRESSURE: 58 MMHG | BODY MASS INDEX: 26.5 KG/M2 | WEIGHT: 190 LBS

## 2025-05-03 DIAGNOSIS — L60.0 INGROWN TOENAIL: Primary | ICD-10-CM

## 2025-05-03 PROCEDURE — 99284 EMERGENCY DEPT VISIT MOD MDM: CPT

## 2025-05-03 PROCEDURE — 99283 EMERGENCY DEPT VISIT LOW MDM: CPT

## 2025-05-03 RX ORDER — NAPROXEN 500 MG/1
500 TABLET ORAL 2 TIMES DAILY WITH MEALS
Qty: 30 TABLET | Refills: 0 | Status: SHIPPED | OUTPATIENT
Start: 2025-05-03

## 2025-05-03 NOTE — ED PROVIDER NOTES
Time reflects when diagnosis was documented in both MDM as applicable and the Disposition within this note       Time User Action Codes Description Comment    5/3/2025 11:43 AM Jeanna Dobson Add [L60.0] Ingrown toenail           ED Disposition       ED Disposition   Discharge    Condition   Stable    Date/Time   Sat May 3, 2025 11:43 AM    Comment   Richard Rogers discharge to home/self care.                   Assessment & Plan       Medical Decision Making  53 y/o male here for a one week history of right great toe pain and mild redness.  Hurts to the touch and is painful to walk on, but he is able to ambulate.  Denies fever, chills, malaise, injury to the toenail, drainage of the area.  He denies injury area.  Patient is well-appearing on exam, no distress.  Vitals are normal.  He is afebrile.  Right great toe is mildly lateral side by the toenail.  No pain anywhere else.  No joint pain or swelling.  Prescribed naproxen and Referral for podiatry for the likely ingrown toenail.  Discussed supportive care with warm soaks with Epsom salt, anti-inflammatories, pain control, follow-up, risks and return precautions.  Patient was agreeable to plan and was discharged home.    Risk  Prescription drug management.             Medications - No data to display    ED Risk Strat Scores                    No data recorded                            History of Present Illness       Chief Complaint   Patient presents with    Toe Pain     C/o right big toe swelling and pain.        Past Medical History:   Diagnosis Date    Depression     Sleep difficulties       Past Surgical History:   Procedure Laterality Date    BACK SURGERY      COLON SURGERY      HERNIA REPAIR      IR PORT CHECK  12/31/2024      History reviewed. No pertinent family history.   Social History     Tobacco Use    Smoking status: Never    Smokeless tobacco: Never   Vaping Use    Vaping status: Never Used   Substance Use Topics    Alcohol use: Never    Drug use:  Never      E-Cigarette/Vaping    E-Cigarette Use Never User       E-Cigarette/Vaping Substances    Nicotine No     THC No     CBD No     Flavoring No     Other No     Unknown No       I have reviewed and agree with the history as documented.     Patient is a 53 y/o male here for a one week history of right great toe pain and mild redness.  States it hurts to the touch and is painful to walk on.  He is able to ambulate.  Denies fever, chills, malaise.  Denies any other symptoms.  Denies injury to the toenail.  Denies any drainage from the area.      Toe Pain  Associated symptoms: no abdominal pain, no chest pain, no congestion, no cough, no diarrhea, no ear pain, no fever, no headaches, no myalgias, no nausea, no rash, no rhinorrhea, no shortness of breath, no sore throat and no vomiting        Review of Systems   Constitutional:  Negative for chills and fever.   HENT:  Negative for congestion, ear pain, rhinorrhea and sore throat.    Eyes:  Negative for pain and visual disturbance.   Respiratory:  Negative for cough and shortness of breath.    Cardiovascular:  Negative for chest pain and palpitations.   Gastrointestinal:  Negative for abdominal pain, diarrhea, nausea and vomiting.   Genitourinary:  Negative for dysuria and hematuria.   Musculoskeletal:  Negative for arthralgias, back pain, myalgias, neck pain and neck stiffness.   Skin:  Negative for color change and rash.   Neurological:  Negative for dizziness, seizures, syncope, weakness, light-headedness and headaches.   All other systems reviewed and are negative.      Objective       ED Triage Vitals [05/03/25 1130]   Temperature Pulse Blood Pressure Respirations SpO2 Patient Position - Orthostatic VS   97.9 °F (36.6 °C) 67 125/58 18 96 % Sitting      Temp Source Heart Rate Source BP Location FiO2 (%) Pain Score    Temporal Monitor Left arm -- --      Vitals      Date and Time Temp Pulse SpO2 Resp BP Pain Score FACES Pain Rating User   05/03/25 1130 97.9 °F  (36.6 °C) 67 96 % 18 125/58 -- -- MS            Physical Exam  Vitals and nursing note reviewed.   Constitutional:       General: He is not in acute distress.     Appearance: Normal appearance. He is not ill-appearing, toxic-appearing or diaphoretic.   HENT:      Head: Normocephalic.   Eyes:      Extraocular Movements: Extraocular movements intact.      Conjunctiva/sclera: Conjunctivae normal.      Pupils: Pupils are equal, round, and reactive to light.   Cardiovascular:      Rate and Rhythm: Normal rate and regular rhythm.      Pulses: Normal pulses.      Heart sounds: Normal heart sounds.   Pulmonary:      Effort: Pulmonary effort is normal. No tachypnea, accessory muscle usage or respiratory distress.      Breath sounds: Normal breath sounds. No stridor. No wheezing, rhonchi or rales.   Chest:      Chest wall: No tenderness.   Musculoskeletal:         General: Normal range of motion.      Cervical back: Normal range of motion and neck supple.        Feet:    Feet:      Right foot:      Skin integrity: Erythema present.      Left foot:      Skin integrity: Skin integrity normal.   Skin:     General: Skin is warm and dry.   Neurological:      General: No focal deficit present.      Mental Status: He is alert and oriented to person, place, and time.   Psychiatric:         Mood and Affect: Mood normal.         Behavior: Behavior normal.         Thought Content: Thought content normal.         Judgment: Judgment normal.         Results Reviewed       None            No orders to display       Procedures    ED Medication and Procedure Management   Prior to Admission Medications   Prescriptions Last Dose Informant Patient Reported? Taking?   Apple Cider Vinegar 500 MG TABS   Yes No   Sig: Take 1 tablet by mouth daily   Ascorbic Acid (vitamin C) 1000 MG tablet  Self Yes No   Sig: Take 1,000 mg by mouth daily   Cholecalciferol (D3) 25 MCG (1000 UT) capsule   Yes No   Sig: Take 25 mcg by mouth daily   Multiple Vitamin  (MULTIVITAMIN ADULT PO)   Yes No   Sig: Take 1 tablet by mouth daily   Omega-3 Fatty Acids (Fish Oil) 1200 MG CAPS  Self Yes No   Sig: Take 1,200 mg by mouth in the morning   Zinc Picolinate POWD   Yes No   Sig: Take 1 tablet by mouth daily   atorvastatin (LIPITOR) 40 mg tablet   No No   Sig: Take 1 tablet (40 mg total) by mouth daily   buPROPion (Wellbutrin XL) 150 mg 24 hr tablet   No No   Sig: Take 1 tablet (150 mg total) by mouth daily   escitalopram (LEXAPRO) 20 mg tablet   No No   Sig: Take 1 tablet (20 mg total) by mouth daily   fluticasone (FLONASE) 50 mcg/act nasal spray   No No   Sig: INSTILL 1 SPRAY INTO EACH NOSTRIL TWICE A DAY   lisdexamfetamine (VYVANSE) 70 MG capsule   No No   Sig: Take 1 capsule (70 mg total) by mouth every morning Max Daily Amount: 70 mg   loratadine (CLARITIN) 10 mg tablet   Yes No   Sig: Take 10 mg by mouth daily   metFORMIN (GLUCOPHAGE-XR) 750 mg 24 hr tablet   No No   Sig: take 1 tablet by mouth once daily with breakfast   methocarbamol (ROBAXIN) 500 mg tablet   No No   Sig: Take 1 tablet (500 mg total) by mouth 2 (two) times a day   montelukast (SINGULAIR) 10 mg tablet   No No   Sig: Take 1 tablet (10 mg total) by mouth daily at bedtime   omeprazole (PriLOSEC) 20 mg delayed release capsule  Self Yes No   Sig: Take 20 mg by mouth daily   pramipexole (MIRAPEX) 0.5 mg tablet   No No   Sig: take 1 tablet by mouth at bedtime   pregabalin (LYRICA) 75 mg capsule   No No   Sig: TAKE 1 CAPSULE BY MOUTH TWICE A DAY   simethicone (MYLICON,GAS-X) 180 MG capsule   No No   Sig: Take 1 capsule (180 mg total) by mouth every 6 (six) hours as needed for flatulence   vardenafil (LEVITRA) 20 MG tablet  Self No No   Sig: Take 1 tablet (20 mg total) by mouth daily as needed for erectile dysfunction   zinc gluconate 50 mg tablet  Self Yes No   Sig: Take 50 mg by mouth daily      Facility-Administered Medications: None     Patient's Medications   Discharge Prescriptions    NAPROXEN (NAPROSYN) 500 MG  TABLET    Take 1 tablet (500 mg total) by mouth 2 (two) times a day with meals       Start Date: 5/3/2025  End Date: --       Order Dose: 500 mg       Quantity: 30 tablet    Refills: 0       ED SEPSIS DOCUMENTATION   Time reflects when diagnosis was documented in both MDM as applicable and the Disposition within this note       Time User Action Codes Description Comment    5/3/2025 11:43 AM Jeanna Dobson Add [L60.0] Ingrown toenail                  Jeanna Dobson PA-C  05/03/25 1154

## 2025-05-03 NOTE — DISCHARGE INSTRUCTIONS
Take naproxen as needed for pain. Soak foot in warm water with Epsom salt a couple times a day. Follow up with podiatry for possible removal.

## 2025-05-03 NOTE — TELEPHONE ENCOUNTER
Pt calling to get scheduled from referral from hospital for ingrown toe nail.    Please contact pt when office reopens. Thank you

## 2025-05-13 ENCOUNTER — HOSPITAL ENCOUNTER (OUTPATIENT)
Dept: INFUSION CENTER | Facility: CLINIC | Age: 55
Discharge: HOME | End: 2025-05-13
Payer: COMMERCIAL

## 2025-05-13 DIAGNOSIS — Z95.828 PORT-A-CATH IN PLACE: Primary | ICD-10-CM

## 2025-05-13 DIAGNOSIS — C19 ADENOCARCINOMA OF RECTOSIGMOID JUNCTION (HCC): ICD-10-CM

## 2025-05-13 PROCEDURE — 96523 IRRIG DRUG DELIVERY DEVICE: CPT

## 2025-05-13 NOTE — PROGRESS NOTES
Pt here today for port flush, denies any discomforts.  Good blood return noted.  Pt aware of next appointment on 7/7/25 at 830am, declines AVS.

## 2025-06-03 ENCOUNTER — PATIENT MESSAGE (OUTPATIENT)
Dept: SLEEP CENTER | Facility: CLINIC | Age: 55
End: 2025-06-03

## 2025-06-09 ENCOUNTER — TELEPHONE (OUTPATIENT)
Dept: SLEEP CENTER | Facility: CLINIC | Age: 55
End: 2025-06-09

## 2025-07-07 ENCOUNTER — HOSPITAL ENCOUNTER (OUTPATIENT)
Dept: INFUSION CENTER | Facility: CLINIC | Age: 55
Discharge: HOME | End: 2025-07-07
Payer: COMMERCIAL

## 2025-07-07 DIAGNOSIS — Z95.828 PORT-A-CATH IN PLACE: Primary | ICD-10-CM

## 2025-07-07 DIAGNOSIS — C19 ADENOCARCINOMA OF RECTOSIGMOID JUNCTION (HCC): ICD-10-CM

## 2025-07-07 PROCEDURE — 96523 IRRIG DRUG DELIVERY DEVICE: CPT

## 2025-07-07 NOTE — PROGRESS NOTES
Pt presents for port a cath flush. Pt offers no complaints. Port accessed, flushed, saline locked and de-accessed without difficulty. AVS declined, Next appointment reviewed on 9/2 at 8am.